# Patient Record
Sex: FEMALE | Race: WHITE | NOT HISPANIC OR LATINO | Employment: FULL TIME
[De-identification: names, ages, dates, MRNs, and addresses within clinical notes are randomized per-mention and may not be internally consistent; named-entity substitution may affect disease eponyms.]

---

## 2017-06-13 ENCOUNTER — TELEPHONE (OUTPATIENT)
Dept: PEDIATRICS | Age: 27
End: 2017-06-13

## 2017-06-13 NOTE — TELEPHONE ENCOUNTER
I received a request to schedule Day in the Genetics Clinic. 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 I left a message for Day to figure out what specifically she is looking for out of the appointment. We may schedule her in [facility] clinic or with one of our genetic counselors. I provided my direct phone number for a return call to schedule.

## 2017-07-12 ENCOUNTER — TRANSFERRED RECORDS (OUTPATIENT)
Dept: HEALTH INFORMATION MANAGEMENT | Facility: CLINIC | Age: 27
End: 2017-07-12

## 2017-10-20 ENCOUNTER — TELEPHONE (OUTPATIENT)
Dept: MATERNAL FETAL MEDICINE | Facility: CLINIC | Age: 27
End: 2017-10-20

## 2017-10-20 NOTE — TELEPHONE ENCOUNTER
Writer called Day and left message to call back the [facility] line at [phone] regarding who is following her for her Loeys-Dara Syndrome as we will need those records and an MIREILLE. Karli Petersen RN

## 2017-10-26 DIAGNOSIS — Q87.89 LOEYS-DIETZ SYNDROME: Primary | ICD-10-CM

## 2017-11-20 ENCOUNTER — PRE VISIT (OUTPATIENT)
Dept: MATERNAL FETAL MEDICINE | Facility: CLINIC | Age: 27
End: 2017-11-20

## 2017-11-21 ENCOUNTER — OFFICE VISIT (OUTPATIENT)
Dept: MATERNAL FETAL MEDICINE | Facility: CLINIC | Age: 27
End: 2017-11-21
Attending: OBSTETRICS & GYNECOLOGY
Payer: COMMERCIAL

## 2017-11-21 DIAGNOSIS — Q87.89 LOEYS-DIETZ SYNDROME: ICD-10-CM

## 2017-11-21 DIAGNOSIS — Z31.69 ENCOUNTER FOR PRECONCEPTION CONSULTATION: Primary | ICD-10-CM

## 2017-11-21 PROCEDURE — 96040 ZZH GENETIC COUNSELING, EACH 30 MINUTES: CPT | Mod: ZF | Performed by: GENETIC COUNSELOR, MS

## 2017-11-21 NOTE — MR AVS SNAPSHOT
"              After Visit Summary   2017    Day Merlos    MRN: [id number]           Patient Information     Date Of Birth          1990        Visit Information        Provider Department      2017 12:45 PM Mally Garcia [facility]        Today's Diagnoses     Loeys-Dara syndrome           Follow-ups after your visit        Who to contact     If you have questions or need follow up information about today's clinic visit or your schedule please contact [facility] directly at [phone].  Normal or non-critical lab and imaging results will be communicated to you by Girltankhart, letter or phone within 4 business days after the clinic has received the results. If you do not hear from us within 7 days, please contact the clinic through PaeDaet or phone. If you have a critical or abnormal lab result, we will notify you by phone as soon as possible.  Submit refill requests through Tidemark or call your pharmacy and they will forward the refill request to us. Please allow 3 business days for your refill to be completed.          Additional Information About Your Visit        MyChart Information     Tidemark lets you send messages to your doctor, view your test results, renew your prescriptions, schedule appointments and more. To sign up, go to [email or web address] . Click on \"Log in\" on the left side of the screen, which will take you to the Welcome page. Then click on \"Sign up Now\" on the right side of the page.     You will be asked to enter the access code listed below, as well as some personal information. Please follow the directions to create your username and password.     Your access code is: [email or web address]  Expires: 2018  5:03 PM     Your access code will  in 90 days. If you need help or a new code, please call your [place] clinic or [phone].        Care EveryWhere ID     This is your Care EveryWhere ID. " This could be used by other organizations to access your [place] medical records  [id number]         Blood Pressure from Last 3 Encounters:   02/21/12 110/70    Weight from Last 3 Encounters:   02/21/12 61.3 kg (135 lb 2.3 oz)              We Performed the Following     [facility]        Primary Care Provider Office Phone # Fax #    Jennifer DEVANTE Peraza [phone]       [facility] [address] [address]        Equal Access to Services     [facility] : Hadii aad ku hadasho Soomaali, waaxda luqadaha, qaybta kaalmada adeegyada, waxay idiin hayaan adeeg kharaimelad nguyen . So [facility] [phone].    ATENCIÓN: Si habla español, tiene a bland disposición servicios gratuitos de asistencia lingüística. [facility] [phone].    We comply with applicable federal civil rights laws and [place] laws. We do not discriminate on the basis of race, color, national origin, age, disability, sex, sexual orientation, or gender identity.            Thank you!     Thank you for choosing [facility]  for your care. Our goal is always to provide you with excellent care. Hearing back from our patients is one way we can continue to improve our services. Please take a few minutes to complete the written survey that you may receive in the mail after your visit with us. Thank you!             Your Updated Medication List - Protect others around you: Learn how to safely use, store and throw away your medicines at www.disposemymeds.org.          This list is accurate as of: 11/21/17  5:03 PM.  Always use your most recent med list.                   Brand Name Dispense Instructions for use Diagnosis    AMBIEN PO      Take 5 mg by mouth At Bedtime        APRI PO      Take  by mouth daily.    Familial aortic aneurysm, Thin skin, Joint hyperextensibility of multiple sites       CATAFLAM 50 MG Tabs   Generic drug:  Diclofenac Potassium      Take  by mouth as  needed.    Familial aortic aneurysm, Thin skin, Joint hyperextensibility of multiple sites       DAILY MULTIVITAMIN PO      Take  by mouth.    Familial aortic aneurysm, Thin skin, Joint hyperextensibility of multiple sites       FLEXERIL 10 MG tablet   Generic drug:  cyclobenzaprine      Take 10 mg by mouth as needed.    Familial aortic aneurysm, Thin skin, Joint hyperextensibility of multiple sites       GABAPENTIN PO      Take 200 mg by mouth daily.    Familial aortic aneurysm, Thin skin, Joint hyperextensibility of multiple sites       metoprolol 25 MG 24 hr tablet    TOPROL-XL     Take 25 mg by mouth daily.    Familial aortic aneurysm, Thin skin, Joint hyperextensibility of multiple sites       midodrine 5 MG tablet    PROAMATINE     Take 5 mg by mouth 3 times daily.    Familial aortic aneurysm, Thin skin, Joint hyperextensibility of multiple sites       omeprazole 20 MG tablet      Take 20 mg by mouth daily.    Familial aortic aneurysm, Thin skin, Joint hyperextensibility of multiple sites       PROZAC PO      Take 20 mg by mouth daily

## 2017-11-21 NOTE — PROGRESS NOTES
"[facility]  Genetic Counseling Consult    Patient:  Day Merlos YOB: 1990   Date of Service:  11/21/17      Day \"Saundra\" Gaurang was seen, along with her  Bigg, at the [facility] for preconception genetic and maternal fetal medicine consultations for the indication of her personal history of Loeys Dara syndrome.       Impression/Plan:   Saundra has a complex medical history.  She and Bigg shared today that they have always been told that Saundra should not become pregnant and they would like to become more informed about what the actual risks would be to Saundra both during and following a pregnancy.    The purpose of our consultation today was to obtain family history and to review that if Saundra should have biological children, they would each have a 50% chance of inheriting the TGFB2 mutation that has been identified in her family.  This genetic change causes the connective tissue disorder Loeys-Dara syndrome, which is a condition that is characterized by enlargement of the aorta, aortic dissection, other arterial aneurysms/dissections, skeletal problems, contractures, degenerative disc disease, and other features of connective tissue disorders including abnormal wound healing, easy bruising, and hyper flexibility.  Symptoms of Loeys-Dara can vary in severity and presence between family members.  Prenatal complications can also include incompetent cervix, PPROM, and fetal birth defects such as cleft palate and club foot.  There is also significant maternal risk factors (see MFM consult).    IVF with PGD is an option as the familial mutation is known.    Saundra and Bigg shared that they have assumed that pregnancy is contraindicated for Saundra, and are uncertain if they would desire for her to achieve a pregnancy.  They would like to start with MFM consult today to gather more information and will continue to consider if it " is something they desire.        Pregnancy History:     /Parity:        Family History:   A three-generation pedigree was obtained, and is scanned under the  Media  tab.   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  Her symptoms include hyperextensibility, thin skin with increased vascularity, scoliosis, and lumbar hemivertebrae.  She has many affected family members including her brother, mom, 3 maternal aunts, and 2 maternal uncles, and numerous cousins.  There is also a history of aneurysms and mitral valve prolapse for many of her family members.  These are features of the family's underlying connective tissue disorder.  Risk to Saundra's children is 50%.      Otherwise, the reported family history is negative for multiple miscarriages, stillbirths, birth defects, mental retardation, known genetic conditions, and consanguinity.          It was a pleasure to be involved with Day s care. Face-to-face time of the meeting was 30 minutes.      Mally Garcia, [facility]  Certified Genetic Counselor  Pager: [phone]

## 2017-11-21 NOTE — PROGRESS NOTES
Maternal Fetal Medicine Consult      Referring Physician: Dr. Darby Marrero  Reason for Referral: Preconception counseling- Loeys-Dara syndrome      Dear Dr. Marrero,     Thank you for your request for maternal fetal medicine consultation on your patient, Day Merlos.  She is here today with her significant other, Bigg.      HPI: Day Merlos is a 27 year old  with a medical history of Loeys-Dara (LD) syndrome, POTS syndrome, migraines, spinal stenosis, scoliosis, and depression/anxiety who presents for preconception counseling. Her LD syndrome manifests as join laxity and injury, bruising, scoliosis, and slow healing. She has lumbar spinal stenosis and underwent lumbar fusion in . She reports a prolonged hospital stay due to slow healing following this procedure. She also has a history of bilateral inguinal hernia repair as a young child. She denies a personal history of aneurysm, arrhythmias, excessive bleeding, or joint dislocation. She denies a history of easy bleeding after her lumbar fusion or following wisdom teeth removal. She reports her cardiologist has instructed her to avoid activities that would increase her blood pressure including prolonged vomiting, cough, and strenuous exercise. She has had multiple MRA of her head/neck and multiple echos in the past which have shown no evidence of aneurysm or dissection. She reports this summer she obtained an MRA head/neck, CT chest/abdomen/pelvis, and an echo. Again these studies revealed no evidence of aneurysm or dissection, however her CT scan revealed an incidental liver lesion. She is getting a follow-up CT scan to assess the liver lesion later this year.    She was diagnosed with LD syndrome 4 years ago when her and her mother underwent whole exome sequencing. 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 Sequencing was performed at the [facility], however I do not have access to the  record at this time. Leona from the genetic counseling center will call lab to get a copy of the report scanned into her chart. See genetic counseling consultation for further details. She has an extensive maternal family history of LD syndrome. Her 3 maternal aunts have required mitral valve replacement for mitral valve regurgitation and aortic valve replacements. Her mother had mitral valve regurgitation and underwent mitral valve replacement. One of her aunts had an aortic aneurysm and was recommended to undergo repair, however she passed away from an unrelated condition prior to repair. Her mother was recommended to undergo aortic valve replacement however she passed away related to malfunction of the prosthetic mitral valve. She thinks one maternal grandparent had LD syndrome and the other had Marfan syndrome, and she thinks that is why her maternal aunts have had severe complications of connective tissue disease.     She also reports a diagnosis of POTS syndrome at 17 years old. She was started on metoprolol and midodrine for that diagnosis and reports good control with this regimen.        History:  ObHx:     GynHx:   Menses: IUD in place, amenorrhea   Uterine procedures: none    PMHx:   Past Medical History:   Diagnosis Date     Anxiety      Depression      Loeys-Dara syndrome      Migraine      POTS (postural orthostatic tachycardia syndrome)      Scoliosis     s/p lumbar spine fusion      SurgHx:   Past Surgical History:   Procedure Laterality Date     C LUMBAR SPINE FUSION,ANTER APPRCH       HERNIORRHAPHY INGUINAL CHILD BILATERAL Bilateral      Medications:   Prozac- anxiety/depression  Flexeril PRN- back pain  Metoprolol 25mg XL- POTS syndrome  Midodrine- POTS syndrome  Diclofenac- migraines   Gabapentin 300mg daily- migraines   Ranitidine- GERD    Allergies:   Allergies   Allergen Reactions     Amoxicillin Hives     Cephalexin Hcl Hives     Penicillins Hives     Adhesive Tape Rash     Compazine  [Prochlorperazine] Anxiety and Other (See Comments)     akathisia     Soc:   Never smoker  Alcohol 1-2 drinks per month  No drug use  Works as an RN on Labor and Delivery in [place]    FamHx:   Please see genetic counselor note for full family medical history.   Maternal LD syndrome: Mother, three maternal aunts, Brother, maternal grandparents.     Lab/Imaging    PEDIATRIC ECHOCARDIOGRAM- 2/21/2012  [facility]  Echocardiogram Lab   Phone [phone]                         Fax [phone]  Age:  [birth year]               Wt:   61.2 kg         Ht:  178 cm        BSA:  1.77 m2          BP:                               Xcelera                                                            Technician:  mrINDICATION:  Familial aortic aneurysm  A cardiac ultrasound study based on an exam which included:   M-Mode                       2-D                        Doppler                             Color FlowCONCLUSION:  Family history of familial aortic aneurysm.  Normal intracardiac anatomy. Good LV/RV function. Aortic root measurements at the sinus of Valsalva, sinotubular ridge and ascending aorta are within normal limits. Z-score values are enclosed.     M-Mode Report          Left Atrium  37   mm          Aortic Root  29  mm               LV end Diastolic  43   mm          LV end Systolic   29  mm                     Shortening Fraction     32%          Intravent Septum  8  mm          LV Post Wall  8  mm                 1. No ECG is recorded.  2. Normal left atrial dimension.  3. Normal left ventricular dimension and contractility.     2-Dimensional Report  Recordings are performed from parasternal, apical, subcostal and suprasternal notch windows. Anatomic relationships are normal. Aortic, mitral, pulmonary and tricuspid valve motions are normal. The systemic venous return was demonstrated and is normal. The atrial septum appears intact. The coronary sinus is normal. Left atrial size is  normal. Right atrial size is normal. Left and right ventricular size and contractility are normal. The ventricular septum appears intact. There is no evidence of pericardial effusion. The pulmonary artery bifurcation and aortic arch appear normal. The sinus of Valsalva was 30 mm, z-score value is +.6 normal value. Sinotubular ridge is 22 mm, z-score value -.5 which is normal. Ascending aorta is 26 mm, normal z-score is at the 83rd percentile.       Not Evaluated  The aortic arch situs and coronary arteries were not evaluated.     Doppler Report  Color flow and spectral Doppler are utilized. Trivial tricuspid insufficiency, incomplete velocity signal. No mitral insufficiency. Tricuspid inflow is 0.8 m/sec. Normal flows are recorded in the right ventricular outflow tract, main pulmonary artery (1 m/sec), left ventricular outflow tract and ascending aorta (1 m/sec). At least two pulmonary veins return to the left atrium. Arch is unobstructed. abdominal aortic Doppler is normal. Descending aortic peak velocity is 1.1 m/sec.  There is no evidence of a left-to-right shunt at atrial, ventricular or great artery levels.    Luciano Bronw MD-Pager [phone]  Carli Mcgee MD-Pager [phone]  Juan Quintana MD-Pager [phone] or [phone]  Jason Lozano MD-Pager [phone]  Rony Ceja MD-Pager [phone]  Ena Perales MD-Pager [phone]  Joe Shane MD-Pager # [phone]  Tiffanie King MD - Pager  [phone]       Neck MRA without and with contrast  1/26/2016     History: Cerebral aneurysm, nonruptured.      Comparison: none      Technique: 2D time-of-flight and contrast-enhanced 3D time-of-flight images of the major cervical vessels were obtained without intravenous contrast. Images were reviewed on the 3-dimensional workstation. Limited non contrast 2DTOF images were obtained of the mid-cervical region. Following intravenous gadolinium-based contrast administration, a contrast  enhanced MRA of the neck/cervical vessels was performed. Three-dimensional reconstructions of the neck and head MRA were created, which were reviewed by the radiologist.     Contrast: 7.5 ml Gadavist injected     Findings:  Neck MRA demonstrates patent major cervical arteries. Origin of the bilateral common carotid, internal carotid, external carotid arteries are patent. No evidence for stenosis or dissection in bilateral vertebral arteries. No evidence of aneurysm in the major cervical arteries. Antegrade flow in the major cervical vasculature.     Impression:  Normal neck MRA.         I have personally reviewed the examination and initial interpretation and I agree with the findings.     LARS GARCIA MD      ______________________________________________________________    MRA ANGIOGRAM HEAD W/O CONTRAST 1/26/2016 12:29 PM     History: Cerebral aneurysm, nonruptured. History of enlarged aortic  root and a repaired mitral valve. Migraines.     Comparison:  None available.     Technique:   MRI: Sagittal T1-weighted, axial T2-weighted with fat saturation, turboFLAIR and diffusion-weighted with ADC map and coronal T1-weighted and T2-weighted images of the brain were obtained without intravenous contrast.       MRA Head:  Using a 3D time-of-flight image acquisition technique, MRA of the major arteries at the base of the brain was obtained without intravenous contrast. Three-dimensional reconstructions of the head MRA were created, which were reviewed by the radiologist.     Findings:   Head MRA demonstrates patent major intracranial arteries, without evidence of aneurysm. There is 2.2 mm infundibulum in the origin of the left posterior communicating artery. The anterior communicating artery appears patent. The posterior communicating artery is patent bilaterally.          Impression:   2.2 mm infundibulum in the left PComm origin. Otherwise no evidence of intracranial aneurysm is identified.         I have  personally reviewed the examination and initial interpretation and I agree with the findings.     LARS GARCIA MD       MRI BRAIN AND MRA HEAD WITHOUT CONTRAST- 2/28/2011    CLINICAL HISTORY: Headache.    MRI BRAIN WITHOUT CONTRAST    FINDINGS: The ventricles and sulci are within normal limits for age. There is no midline shift or mass effect. The basal cisterns are patent. There is no white matter disease. There are no foci of restricted diffusion to suggest acute ischemia. The paranasal sinuses and mastoid air cells are clear. The globes and orbits are unremarkable.    IMPRESSION: Unremarkable MRI of the brain.    MRA OF THE HEAD WITHOUT CONTRAST    FINDINGS: The upper vertebral arteries are codominant and widely patent. The basilar arteries are normal in caliber and patent. The posterior cerebral arteries are unremarkable. There appear to be patent bilateral PCOM arteries, though small on the right.     The distal internal carotid arteries, middle cerebral arteries, anterior cerebral arteries and region of the ACOM are essentially unremarkable apart from diminished caliber of the left ICA at the supraclinoid portion which is probably artifactual. There is no aneurysm. There is no high-grade stenosis or occlusion. There is no vascular malformation.    IMPRESSION: Essentially unremarkable MRA of the head. No aneurysm.       Impression:  We discussed with Ms. Solorzano that limited data is available to guide management of Loeys-Dara (LD) in pregnancy. However, it behaves similar to Salinas-Danlos syndrome Type IV and is treated in a similar manner during pregnancy. LD syndrome inheritance form is autosomal dominant however it can also arise de angie. Loeys-Dara syndrome can be associated with craniofacial characteristics (ex.Type 1 Loeys-Dara syndrome is associated with hypertelorism, craniosynostosis, cleft palate), scoliosis, easy bruising, widespread arterial tortuosity, and aortic aneurysms.  Aortic  aneurysms in LD syndrome have a higher risk of dissection or rupture, and this can occur at a smaller size than the general population. We recommend close follow up with cardiology during pregnancy- we would refer to Dr. Hannah Quintana for preconception consultation as well. We also recommend that she speak with a vascular surgeon (we recommended Dr. Misha Garcia) and with hematology due to risk of co-existing bleeding diatheses in patients with connective tissue disorders. We also recommend management or co-management with MFM during pregnancy. We discussed the following maternal, fetal, delivery, and post-partum considerations for patients with LD syndrome:     Maternal considerations: Hemodynamic and physiologic changes in pregnancy and the post-partum period increase the risk of aortic aneurysm and dissection. Most information regarding maternal outcomes in patients with LD syndrome are based on case studies. Women with LD syndrome have an increased risk of aortic dissection in pregnancy, however an accurate assessment of risk of dissection compared to the baseline risk is difficult to determine. Aortic dissection can occur at any time in pregnancy but most commonly in the third trimester or post-partum and can have high morbidity and mortality. Therefore we recommend an echo prior to pregnancy (patient reports she had an echo in 2017 which was normal) as well as a comprehensive angiogram imaging of the brain, thorax, abdomen, and pelvis (patient reports she had an MRA head/neck and CT chest/abdomen/pelvis in 2017 which were normal). She signed a release of information for [facility] today. We recommend continuing a beta-blocker in pregnancy. We discussed the associated fetal growth restriction with beta blockers however the benefits of beta blocker in this patient outweigh that risk. Labetalol is the most studied beta blocker in pregnancy and has a good safety profile, however continuing metoprolol is reasonable.  We do not recommend atenolol in pregnancy. Blood pressure goal should be <130/80 during pregnancy. We recommend repeat echo in the 1st trimester and 3rd trimester. We do not recommend angiography in pregnancy unless she develops signs or symptoms concerning for aortic aneurysm or dissection. In that circumstance we recommend MRA, however CT angiography should be used in the urgent setting. We also discussed that pregnancy can lead to worsening joint function and pain. Finally, we discussed the option of adoption or gestational carrier to avoid the maternal risks associated with LD syndrome.    Fetal considerations: We discussed the option for pre-conception genetic testing through reproductive infertility specialist to avoid transmission of LD syndrome to the fetus. We also discussed the option of fetal genetic screening with CVS or amniocentesis. Due to the risk of fetal inheritance, we recommend a level 2 ultrasound between 18-20 weeks gestation and a fetal echo between 22-24 weeks gestation. We also recommend growth scans every 4 weeks after the second trimester ultrasound. Due to the increased risk of short cervix in patients with LD syndrome (she reports her mother required cerclage for both pregnancies), we recommend serial cervical length testing starting at 16 weeks gestation.     Delivery considerations: We recommend delivery at a tertiary care center where a NICU and vascular surgeon are available. We recommend  delivery between 34-36 weeks gestation due to reported risk of pelvic vessel rupture, uterine rupture, and aortic dissection during labor. She should receive  betamethasone prior to delivery for fetal lung maturity. We do not recommend a trial of labor. We recommend antepartum consultation with anesthesia regarding epidural vs general anesthesia consideration due to her complex spine history. Blood pressure should be monitored closely with goal to remain <130/80. Medications that  can result in increased blood pressure (ex Methergine in the case of post-partum hemorrhage) should be used with caution.    Post-partum period:  Post-operatively, she is at increased risk of poor wound healing or dehiscence and should have close follow-up. In addition, the risk of aortic dissection associated with pregnancy persists for weeks after delivery and can occur despite satisfactory blood pressure control. She should seek immediate medical attention in the event of new symptoms compatible with aortic dissection.    We reviewed that our MFM group does have experience with recent patients with confirmed vascular (type 4) EDS who had excellent outcomes, but that pregnancy does have to be carefully considered because there are substantial risks associated with it.  The patient's mother had 2 children (vaginally).  The patient's 1 aunt had a number of children and the other affected aunt did not have any children.     We did address POTS briefly and would continue to co-manage this condition with cardiology.       Recommendations:   1. Close follow up with Cardiology in pregnancy.   2. Recommend consultation with vascular surgery and hematology and cardiology at the [facility].  3. Co-management of pregnancy with MFM  4. Maternal considerations   1. Baseline Echo prior to pregnancy, repeat echo in the 1st trimester and 3rd trimester   2. Baseline comprehensive angiography of the brain, thorax, abdomen, and pelvis prior to pregnancy. Repeat angiography as needed during pregnancy if concerns of aortic aneurysm  3. Continue beta blocker in pregnancy  4. Goal blood pressure in pregnancy <130/80  5. Fetal considerations   1. Pregestational genetic testing and/or gestational genetic testing per patient preference  2. Cervical length monitoring every two weeks between 16-23 weeks gestation   3. Level 2 ultrasound at 18-20 weeks gestation and monthly growth scans thereafter   4. Fetal echo at 22-24 weeks  gestation   6. Delivery considerations  1. Anesthesia consult antepartum  2.  delivery between 34-36 weeks gestation following  betamethasone course for fetal lung maturity  3. Delivery at a tertiary care center with NICU and vascular surgeon available (alerted the patient that delivery in [place] is probably unlikely).  4. Caution with medications that may increase blood pressure  7. Post-partum  1. Close monitoring of wound healing  2. Close monitoring for signs or symptoms of aortic aneurysm or dissection    Follow up with MFM when pregnancy is confirmed.     I served as scribe for Dr. Solorzano for this consultation.     Francoise Yu MD   Resident Physician, PGY2  Obstetrics, Gynecology, and Women's Health     A copy of this consultation will be sent to your office.    Thank you for the opportunity to participate in the care of this patient.  If you have questions regarding today s evaluation or if we can be of further service, please contact the [facility]    Attestation:   The documentation recorded by the scribe accurately reflects the services I personally performed and the decisions made by me.   Alycia Solorzano, DO  Maternal Fetal Medicine Specialist         The patient was seen for an established outpatient visit.  I spent a total of 60 minutes face to face with Day Merlos during today's visit. Over 50% of this time was spent counseling the patient and/or coordinating care regarding preconception counseling for LD syndrome.

## 2017-11-21 NOTE — NURSING NOTE
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 Leona Garcia met with family. Dr Yu and Dr Solorzano met with family to discuss MFM recommendations. [facility] obtain most recent maternal ECHO/CT/MRA this past summer. Day encouraged to make appt with [facility] for f/u. Patient discharged stable and ambulatory. TESSA NOVAK RN

## 2017-11-21 NOTE — MR AVS SNAPSHOT
"              After Visit Summary   2017    Day Merlos    MRN: [id number]           Patient Information     Date Of Birth          1990        Visit Information        Provider Department      2017 1:30 PM Alycia Solorzano, DO [facility]        Today's Diagnoses     Encounter for preconception consultation    -  1    Loeys-Dara syndrome           Follow-ups after your visit        Who to contact     If you have questions or need follow up information about today's clinic visit or your schedule please contact [facility] directly at [phone].  Normal or non-critical lab and imaging results will be communicated to you by Chope Grouphart, letter or phone within 4 business days after the clinic has received the results. If you do not hear from us within 7 days, please contact the clinic through [email or web address] or phone. If you have a critical or abnormal lab result, we will notify you by phone as soon as possible.  Submit refill requests through Loudie or call your pharmacy and they will forward the refill request to us. Please allow 3 business days for your refill to be completed.          Additional Information About Your Visit        MyChart Information     Loudie lets you send messages to your doctor, view your test results, renew your prescriptions, schedule appointments and more. To sign up, go to [email or web address] . Click on \"Log in\" on the left side of the screen, which will take you to the Welcome page. Then click on \"Sign up Now\" on the right side of the page.     You will be asked to enter the access code listed below, as well as some personal information. Please follow the directions to create your username and password.     Your access code is: [email or web address]  Expires: 2018  5:03 PM     Your access code will  in 90 days. If you need help or a new code, please call your [place] clinic or [phone].      "   Care EveryWhere ID     This is your Care EveryWhere ID. This could be used by other organizations to access your [place] medical records  [id number]         Blood Pressure from Last 3 Encounters:   02/21/12 110/70    Weight from Last 3 Encounters:   02/21/12 61.3 kg (135 lb 2.3 oz)              We Performed the Following     [facility] Office Visit        Primary Care Provider Office Phone # Fax #    Jennifer DEVANTE Peraza [phone]       [facility] [address] [address]        Equal Access to Services     [facility]: Hadii aad ku hadasho Soomaali, waaxda luqadaha, qaybta kaalmada adeegyada, waxjennifer redd haymanuel nguyen . So [facility] [phone].    ATENCIÓN: Si habla español, tiene a bland disposición servicios gratuitos de asistencia lingüística. [facility] [phone].    We comply with applicable federal civil rights laws and [place] laws. We do not discriminate on the basis of race, color, national origin, age, disability, sex, sexual orientation, or gender identity.            Thank you!     Thank you for choosing [facility]  for your care. Our goal is always to provide you with excellent care. Hearing back from our patients is one way we can continue to improve our services. Please take a few minutes to complete the written survey that you may receive in the mail after your visit with us. Thank you!             Your Updated Medication List - Protect others around you: Learn how to safely use, store and throw away your medicines at www.disposemymeds.org.          This list is accurate as of: 11/21/17 11:59 PM.  Always use your most recent med list.                   Brand Name Dispense Instructions for use Diagnosis    AMBIEN PO      Take 5 mg by mouth At Bedtime        APRI PO      Take  by mouth daily.    Familial aortic aneurysm, Thin skin, Joint hyperextensibility of multiple sites       CATAFLAM 50 MG Tabs   Generic  drug:  Diclofenac Potassium      Take  by mouth as needed.    Familial aortic aneurysm, Thin skin, Joint hyperextensibility of multiple sites       DAILY MULTIVITAMIN PO      Take  by mouth.    Familial aortic aneurysm, Thin skin, Joint hyperextensibility of multiple sites       FLEXERIL 10 MG tablet   Generic drug:  cyclobenzaprine      Take 10 mg by mouth as needed.    Familial aortic aneurysm, Thin skin, Joint hyperextensibility of multiple sites       GABAPENTIN PO      Take 200 mg by mouth daily.    Familial aortic aneurysm, Thin skin, Joint hyperextensibility of multiple sites       metoprolol 25 MG 24 hr tablet    TOPROL-XL     Take 25 mg by mouth daily.    Familial aortic aneurysm, Thin skin, Joint hyperextensibility of multiple sites       midodrine 5 MG tablet    PROAMATINE     Take 5 mg by mouth 3 times daily.    Familial aortic aneurysm, Thin skin, Joint hyperextensibility of multiple sites       omeprazole 20 MG tablet      Take 20 mg by mouth daily.    Familial aortic aneurysm, Thin skin, Joint hyperextensibility of multiple sites       PROZAC PO      Take 20 mg by mouth daily

## 2017-11-25 ENCOUNTER — HEALTH MAINTENANCE LETTER (OUTPATIENT)
Age: 27
End: 2017-11-25

## 2018-01-29 ENCOUNTER — TRANSFERRED RECORDS (OUTPATIENT)
Dept: HEALTH INFORMATION MANAGEMENT | Facility: CLINIC | Age: 28
End: 2018-01-29

## 2018-02-05 ENCOUNTER — TRANSFERRED RECORDS (OUTPATIENT)
Dept: HEALTH INFORMATION MANAGEMENT | Facility: CLINIC | Age: 28
End: 2018-02-05

## 2018-02-06 ENCOUNTER — TRANSFERRED RECORDS (OUTPATIENT)
Dept: HEALTH INFORMATION MANAGEMENT | Facility: CLINIC | Age: 28
End: 2018-02-06

## 2018-02-19 ENCOUNTER — TRANSFERRED RECORDS (OUTPATIENT)
Dept: HEALTH INFORMATION MANAGEMENT | Facility: CLINIC | Age: 28
End: 2018-02-19

## 2018-02-22 ENCOUNTER — MEDICAL CORRESPONDENCE (OUTPATIENT)
Dept: HEALTH INFORMATION MANAGEMENT | Facility: CLINIC | Age: 28
End: 2018-02-22

## 2018-04-06 PROBLEM — Q87.89: Status: ACTIVE | Noted: 2018-04-06

## 2018-05-08 ENCOUNTER — OFFICE VISIT (OUTPATIENT)
Dept: CARDIOLOGY | Facility: CLINIC | Age: 28
End: 2018-05-08
Attending: INTERNAL MEDICINE
Payer: COMMERCIAL

## 2018-05-08 VITALS
BODY MASS INDEX: 21.2 KG/M2 | SYSTOLIC BLOOD PRESSURE: 110 MMHG | DIASTOLIC BLOOD PRESSURE: 72 MMHG | OXYGEN SATURATION: 98 % | WEIGHT: 148.1 LBS | HEIGHT: 70 IN | HEART RATE: 54 BPM

## 2018-05-08 DIAGNOSIS — Q87.89 LOEYS-DIETZ SYNDROME TYPE 4: Primary | ICD-10-CM

## 2018-05-08 DIAGNOSIS — G90.A POTS (POSTURAL ORTHOSTATIC TACHYCARDIA SYNDROME): ICD-10-CM

## 2018-05-08 PROCEDURE — 93010 ELECTROCARDIOGRAM REPORT: CPT | Mod: ZP | Performed by: INTERNAL MEDICINE

## 2018-05-08 PROCEDURE — 99205 OFFICE O/P NEW HI 60 MIN: CPT | Mod: ZP | Performed by: INTERNAL MEDICINE

## 2018-05-08 PROCEDURE — G0463 HOSPITAL OUTPT CLINIC VISIT: HCPCS | Mod: 25,ZF

## 2018-05-08 PROCEDURE — 99354 ZZC PROLONGED SERV,OFFICE,1ST HR: CPT | Performed by: INTERNAL MEDICINE

## 2018-05-08 PROCEDURE — 93005 ELECTROCARDIOGRAM TRACING: CPT

## 2018-05-08 ASSESSMENT — PAIN SCALES - GENERAL: PAINLEVEL: NO PAIN (0)

## 2018-05-08 NOTE — NURSING NOTE
Chief Complaint   Patient presents with     New Patient     27 yr old female with PMH significant for Loey's-Dara with bradycardia presenting for evaluation     Vitals were taken and medications were reconciled. EKG was performed    Ruby HERNANDEZ  12:30 PM

## 2018-05-08 NOTE — LETTER
2018      RE: Day Merlos  [address]  [place] [address]       Dear Colleague,    Thank you for the opportunity to participate in the care of your patient, Day Merlos, at the [facility] HEART [facility]. Please see a copy of my visit note below.             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    Family Genetics History:   Mother: h/o mitral and aortic valve replacement at early age (diagnosed with valve disease in high school).  at 54 yrs old from ?thrombosed mitral valve replacement and subsequent congestive heart failure. Father is healthy and alive. Maternal aunt passed at 62 years old unknown etiology. Maternal uncle passed at 59 of unknown etiology. Other aunt passed at 63 due to fall and brain bleed. Brother has LDS. Patient has no children but plans on getting pregnant in some time.     Discussed history of patient herself. She is from [facility] and has most of her care up there, primarily pediatric neurology and family practice. She has been carrying longstanding history of POTS as well as migraines for which she is followed closely with neurology in [place]. She has been getting intermittent brain/neck MRA for known cerebral tortuosity (details unclear - available imaging through care everywhere reveals only normal CT neck angiogram in  and MRA without contrast? In ). Imaging she's had of her chest demonstrated normal caliber aortic root and ascending aorta. Echocardiogram has report of normal aortic valve and mitral valve from .    For POTS, she has been on Toprol 25 XL daily. She did try 12.5 mg bid short acting metoprolol but had severe symptoms in between doses. She feels close to passing out as soon as HR goes high and she  gets extremely winded from tachycardia. HR goes up to 140s-180s at that time. She was told in the past she may need pacemaker. She has never taken midodrine. Last tilt table test was in 2008 that showed HR in the 120s at 70 degree tilt within 5 minutes and subsequent drop in blood pressure.     Patient is an athlete currently - she plays competitive volleyball and also walks, uses eliptical machine, and does light weight lifting. She has no h/o pregnancy but wishes to have children in the future.    On ROS she describes the following: high arch palate (no bifid uvula or cleft palate), easy bruising, arthritis, scoiliosis, hernia repair at 2, joint laxity, heavy periods, migraines and neck pain. No lens dislocation, uvula deformity, chest wall deformity. She had scoliosis surgery when she was younger with no complications per her recollection. Also reports poor circulation of her feet and has been noted to have absent pulses. Also describes upper extremity poor circulation and reynauds. Also has varicose veins.    PAST MEDICAL HISTORY  Past Medical History:   Diagnosis Date     Anxiety      Depression      Loeys-Dara syndrome      Migraine      POTS (postural orthostatic tachycardia syndrome)      Scoliosis     s/p lumbar spine fusion        CURRENT MEDICATIONS  Current Outpatient Prescriptions   Medication Sig Dispense Refill     cyclobenzaprine (FLEXERIL) 10 MG tablet Take 10 mg by mouth as needed.       Diclofenac Potassium (CATAFLAM) 50 MG TABS Take  by mouth as needed.       FLUoxetine HCl (PROZAC PO) Take 20 mg by mouth daily       GABAPENTIN PO Take 300 mg by mouth daily        metoprolol (TOPROL-XL) 25 MG 24 hr tablet Take 25 mg by mouth daily.       midodrine (PROAMATINE) 5 MG tablet Take 5 mg by mouth 3 times daily.       Multiple Vitamin (DAILY MULTIVITAMIN PO) Take  by mouth.       Zolpidem Tartrate (AMBIEN PO) Take 1.75 mg by mouth At Bedtime        Desogestrel-Ethinyl Estradiol (APRI PO) Take  by mouth  "daily.       Omeprazole 20 MG tablet Take 20 mg by mouth daily.         PAST SURGICAL HISTORY:  Past Surgical History:   Procedure Laterality Date     C LUMBAR SPINE FUSION,ANTER APPRCH  2000     HERNIORRHAPHY INGUINAL CHILD BILATERAL Bilateral        ALLERGIES     Allergies   Allergen Reactions     Amoxicillin Hives     Cephalexin Hcl Hives     Penicillins Hives     Adhesive Tape Rash     Compazine [Prochlorperazine] Anxiety and Other (See Comments)     akathisia       FAMILY HISTORY  No family history on file.    VASCULAR FAMILY HISTORY  1st order relative with atherosclerotic PAD: unknown  1st order relative with AAA: unknown    SOCIAL HISTORY  Social History     Social History     Marital status:      Spouse name: N/A     Number of children: N/A     Years of education: N/A     Occupational History     Not on file.     Social History Main Topics     Smoking status: Never Smoker     Smokeless tobacco: Never Used     Alcohol use Yes     Drug use: No     Sexual activity: Yes     Birth control/ protection: IUD     Other Topics Concern     Not on file     Social History Narrative       ROS:   Constitutional: No fever, chills, or sweats. No weight gain/loss   ENT: No visual disturbance, ear ache, epistaxis, sore throat  Allergies/Immunologic: Negative  Respiratory: No cough, hemoptysia  Cardiovascular: As per HPI  GI: No nausea, vomiting, hematemesis, melena, or hematochezia  : No urinary frequency, dysuria, or hematuria  Integument: Negative  Psychiatric: Negative  Neuro: Negative  Endocrinology: Negative   Musculoskeletal: Negative  Vascular: No walking impairment, claudication, ischemic rest pain or nonhealing wounds    EXAM:  /72 (BP Location: Left arm, Patient Position: Chair, Cuff Size: Adult Regular)  Pulse 54  Ht 1.765 m (5' 9.5\")  Wt 67.2 kg (148 lb 1.6 oz)  SpO2 98%  BMI 21.56 kg/m2  In general, the patient is a pleasant female in no apparent distress.    HEENT: NC/AT.  LAUREN.  FELICIANOMI.  " Sclerae white, not injected.  Nares clear.  Pharynx without erythema or exudate.  Dentition intact.   Neck: No adenopathy.  No thyromegaly. Carotids +2/2 bilaterally without bruits.  No jugular venous distension.   Heart: RRR. Normal S1, S2 splits physiologically. No murmur, rub, click, or gallop. The PMI is in the 5th ICS in the midclavicular line. There is no heave.    Lungs: CTA.  No ronchi, wheezes, rales.  No dullness to percussion.   Abdomen: Soft, nontender, nondistended. No organomegaly. No AAA.  No bruits.   Extremities: No clubbing, cyanosis. No wounds. Trace edema BL with +varicosities.    Vascular: No bruits are noted. Absent DP pulses bilaterally with 2+ PT, 2+ radial, 2+ carotid, 2+ femoral.     Labs:  LIPID RESULTS:  No results found for: CHOL, HDL, LDL, TRIG, CHOLHDLRATIO, NHDL    LIVER ENZYME RESULTS:  No results found for: AST, ALT    CBC RESULTS:  Lab Results   Component Value Date    HGB 12.3 04/01/2014       BMP RESULTS:  No results found for: NA, POTASSIUM, CHLORIDE, CO2, ANIONGAP, GLC, BUN, CR, GFRESTIMATED, GFRESTBLACK, EILEEN     A1C RESULTS:  No results found for: A1C    Procedures:      CT ANGIO CAROTID    CLINICAL: Vertebral artery dissection.    TECHNICAL: Omnipaque-300 80 mL IV.    FINDINGS: Degraded examination secondary to motion. Both vertebral  arteries are patent. They are visualized to the level the basilar  artery and intracranial circulation. Both common carotid arteries are  patent. ECAs and ICAs are patent bilaterally. Left subclavian artery  and right brachiocephalic and subclavian arteries appear patent.  Patent anterior and posterior circulation within the brain to the  level of the [place].    IMPRESSION: Negative examination.    Tilt Table (2009)   Blood pressure at rest was 109/74, heart rate 74 beats a minute. After tilting    up the table to 70 degrees, blood pressure first went up to 102/74 and heart    rate 107 beats a minute and then later the heart rate was  91/71 and pulse rate    was 126 beats a minute at 5 minutes.      The test was terminated then. The patient had no shortness of breath but had    chest pain. After bringing the table back to 0 degrees the heart rate    continues to be in the 50s. Blood pressure did not change.     Chest CTPE 2009    Signs and Symptoms for Radiological Exam from IDX:       * CHEST PAIN/SOB    CT CHEST FOR PULMONARY EMBOLI    TECHNICAL DATA: Omnipaque 300, 82 mL IV.    FINDINGS: Normal thoracic aorta. The pulmonary arterial tree is  normally opacified. No adenopathy. The lung fields are clear.    IMPRESSION: Normal evaluation.    Echocardiogram 2009  INTERPRETATION: An M-mode, 2-D, and Doppler echocardiogram was performed.      Left ventricular cavity size is normal. Wall thickness is normal. Regional and    global systolic function are normal. Left ventricular ejection fraction is    visually estimated to be 55%. Left atrium, right atrium, right ventricular    chambers are normal in size. The aortic root diameter is normal in size. The    aortic valve leaflets are normal in thickness and mobility with trace aortic    insufficiency. Mitral valve leaflets are normal in thickness and mobility with    trace mitral regurgitation. Tricuspid valve leaflets are normal in thickness    and mobility. There is no pericardial effusion. Distal aortic arch and    proximal descending thoracic aorta are normal on 2-D and Doppler    echocardiography.     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 She is here for referral to vascular cardiac genetics clinic. MSK involvement includes scoliosis, skin involvement +wound healing abnormalities, with no lens dislocation, or joint laxity. Cranio features include wideset eyes but no bifid uvula or cleft palate, although she has high arch  palate. She reports abnormal tortuosity of intracranial vessels although unable to obtain documentation as carotid CTA and MR without contrast reported normal vessels. Echocardiogram demonstrates no significant valve disease although this was back in 2009.     She has ongoing issues including the following:   -venous insufficiency  -poor UE and LE circulation  -symptomatic POTS but on stable regimen of toprol XL 25    Given the unpredictable nature of vasculature in LDS (especially with associated craniofacial abnormalities), current AHA/ACC guidelines suggest yearly surveillance from intracranial vessels to pelvis. If she is to develop even borderline aortic dilation (ideal to report aortic diameter in Z score and index given her young age, tall height and need for following), early surgical intervention is warranted in these patients compared to other vascular syndromes.     She has not yet had abdomen/pelvis vasculature defined, therefore will obtain MRA/MRV abdomen/pelvis and capture LE vessels given her lack of LASHON pulses and poor circulation and also varicose veins and edema. If this is normal, will obtain routine MRA C/A/P without venogram yearly. In 6 months at same time as visit with me, will obtain the MRA chest and cardiac MRI to evaluate for valvular and aortic abnormalities.     UE duplex can be uptained with distal PPGs to evaluate her raynaud's phenomenon. Will prescribe her GCS 20-30 mmHg for mild venous insufficiency as she is on her feet often and if plans to get pregnant, this will be made worse. CVI is often seen in POTS as well as LDS.    For POTS, there is reported association with connective tissue disease, however with LDS this seems rare (more often EDS type III - hypermobile type and not vascular type IV). She seems to be most stable on toprol XL 25 daily. Offered to switch to short acting for additional titration however patient reports she has gone through multiple regimens and this seems  to work best. At this time she has no syncope, no significant bradycardia therefore I do not see indication for pacemaker unless increasing need for higher dose beta blocker was necessary with subsequent drop in HR and BP. But as she describes, she is OK on current regimen. Again discussed lifestyle modifications. Would avoid any agents that increase BP at this time given her vasculopathy therefore hesitant with mineralocorticoids but could consider if she gets persistently hypotensive. It may be worth in future to repeat her TILT on ideal beta blockade regimen.    Lastly, will need very careful monitoring if she plans to get pregnant and a co-management strategy with high risk MFM. Happy to continue to see patient around that time.    Will see patient back in 6 months. It was a pleasure taking part in the care of this patient.     Total time spent 90 minutes, of which >50% was spent in face-to-face patient evaluation, reviewing data with patient, and coordination of care.       Stephany Noble MD MSc  , Aortopathy Clinic  Division of Vascular Medicine and Congenital Heart Disease  [facility]

## 2018-05-08 NOTE — PROGRESS NOTES
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    Family Genetics History:   Mother: h/o mitral and aortic valve replacement at early age (diagnosed with valve disease in high school).  at 54 yrs old from ?thrombosed mitral valve replacement and subsequent congestive heart failure. Father is healthy and alive. Maternal aunt passed at 62 years old unknown etiology. Uncle passed at 59 of unknown etiology. Other aunt passed at 63 due to fall and brain bleed. Brother has LDS and ?Sticklers disease    Discussed history of patient herself. She has been carrying longstanding history of POTS as well as migraines for which she is followed closely with neurology in [place]. She has had multiple brain scanning for which she's been diagnosed with intracranial vessel tortuosity. For POTS, she has been on Toprol 25 XL daily. She did try 12.5 mg bid short acting metoprolol but had severe symptoms in between doses. She feels close to passing out as soon as HR goes high and she gets extremely winded from tachycardia. HR goes up to 140s-180s at that time. She was told in the past she may need pacemaker.    Patient is an athlete currently - she plays competitive volleyball and also walks, uses eliptical machine, and does light weight lifting. She has no h/o pregnancy but wishes to have children in the future.      Easy bruising, arthritis, scoiliosis, hernia repair at 2, joint laxity, heavy periods,   Migraines had head/neck MRA     No complications with scoliosis surgery       PAST MEDICAL HISTORY  Past Medical History:   Diagnosis Date     Anxiety      Depression      Loeys-Dara syndrome      Migraine      POTS (postural orthostatic tachycardia syndrome)      Scoliosis     s/p lumbar spine fusion        CURRENT MEDICATIONS  Current Outpatient Prescriptions   Medication  "Sig Dispense Refill     cyclobenzaprine (FLEXERIL) 10 MG tablet Take 10 mg by mouth as needed.       Diclofenac Potassium (CATAFLAM) 50 MG TABS Take  by mouth as needed.       FLUoxetine HCl (PROZAC PO) Take 20 mg by mouth daily       GABAPENTIN PO Take 300 mg by mouth daily        metoprolol (TOPROL-XL) 25 MG 24 hr tablet Take 25 mg by mouth daily.       midodrine (PROAMATINE) 5 MG tablet Take 5 mg by mouth 3 times daily.       Multiple Vitamin (DAILY MULTIVITAMIN PO) Take  by mouth.       Zolpidem Tartrate (AMBIEN PO) Take 1.75 mg by mouth At Bedtime        Desogestrel-Ethinyl Estradiol (APRI PO) Take  by mouth daily.       Omeprazole 20 MG tablet Take 20 mg by mouth daily.         PAST SURGICAL HISTORY:  Past Surgical History:   Procedure Laterality Date     C LUMBAR SPINE FUSION,ANTER APPRCH  2000     HERNIORRHAPHY INGUINAL CHILD BILATERAL Bilateral        ALLERGIES     Allergies   Allergen Reactions     Amoxicillin Hives     Cephalexin Hcl Hives     Penicillins Hives     Adhesive Tape Rash     Compazine [Prochlorperazine] Anxiety and Other (See Comments)     akathisia       FAMILY HISTORY  No family history on file.    VASCULAR FAMILY HISTORY  1st order relative with atherosclerotic PAD: {YES / NO:521861::\"Yes\"}  1st order relative with AAA: {YES / NO:043183::\"Yes\"}    SOCIAL HISTORY  Social History     Social History     Marital status:      Spouse name: N/A     Number of children: N/A     Years of education: N/A     Occupational History     Not on file.     Social History Main Topics     Smoking status: Never Smoker     Smokeless tobacco: Never Used     Alcohol use Yes     Drug use: No     Sexual activity: Yes     Birth control/ protection: IUD     Other Topics Concern     Not on file     Social History Narrative       ROS:   Constitutional: No fever, chills, or sweats. No weight gain/loss   ENT: No visual disturbance, ear ache, epistaxis, sore throat  Allergies/Immunologic: Negative  Respiratory: No " "cough, hemoptysia  Cardiovascular: As per HPI  GI: No nausea, vomiting, hematemesis, melena, or hematochezia  : No urinary frequency, dysuria, or hematuria  Integument: Negative  Psychiatric: Negative  Neuro: Negative  Endocrinology: Negative   Musculoskeletal: Negative  Vascular: No walking impairment, claudication, ischemic rest pain or nonhealing wounds    EXAM:  /72 (BP Location: Left arm, Patient Position: Chair, Cuff Size: Adult Regular)  Pulse 54  Ht 1.765 m (5' 9.5\")  Wt 67.2 kg (148 lb 1.6 oz)  SpO2 98%  BMI 21.56 kg/m2  In general, the patient is a pleasant female in no apparent distress.    HEENT: NC/AT.  PERRLA.  EOMI.  Sclerae white, not injected.  Nares clear.  Pharynx without erythema or exudate.  Dentition intact.    Neck: No adenopathy.  No thyromegaly. Carotids +2/2 bilaterally without bruits.  No jugular venous distension.   Heart: RRR. Normal S1, S2 splits physiologically. No murmur, rub, click, or gallop. The PMI is in the 5th ICS in the midclavicular line. There is no heave.    Lungs: CTA.  No ronchi, wheezes, rales.  No dullness to percussion.   Abdomen: Soft, nontender, nondistended. No organomegaly. No AAA.  No bruits.   Extremities: No clubbing, cyanosis, or edema.  No wounds. No varicose veins signs of chronic venous insufficiency.   Vascular: No bruits are noted.   Brachial Radial Ulnar Femoral Popliteal DP PT   Left ***/2 ***/2 ***/2 ***/2 ***/2 ***/2 ***/2   Right ***/2 ***/2 ***/2 ***/2 ***/2 ***/2 ***/2     Labs:  LIPID RESULTS:  No results found for: CHOL, HDL, LDL, TRIG, CHOLHDLRATIO, NHDL    LIVER ENZYME RESULTS:  No results found for: AST, ALT    CBC RESULTS:  Lab Results   Component Value Date    HGB 12.3 04/01/2014       BMP RESULTS:  No results found for: NA, POTASSIUM, CHLORIDE, CO2, ANIONGAP, GLC, BUN, CR, GFRESTIMATED, GFRESTBLACK, EILEEN     A1C RESULTS:  No results found for: A1C    Procedures:    20-30 mmHg GCS   MRA/MRV abdomen/pelvis LE run off  UE duplex " arterial U/S   6 months follow up - same day cardiac MRI/MRA chest     Will do venous comp if neg MRA -multihance   Midodrine 5 mg bid   Continue toprol 25 xl       Assessment and Plan:     20-30 mmHg GCS   MRA/MRV abdomen/pelvis LE run off  UE duplex arterial U/S   6 months follow up - same day cardiac MRI/MRA chest     Will do venous comp if neg MRA -multihance   Midodrine 5 mg bid   Continue toprol 25 xl       CC: Pat - Dr. Horn - PCP, Dr. Martin - general cardiologist, Dr. Munoz - EP

## 2018-05-08 NOTE — NURSING NOTE
Chief Complaint   Patient presents with     New Patient     27 yr old female with PMH significant for Loey's-Dara with bradycardia presenting for evaluation        Cardiac Testing: Patient given instructions regarding  CMRI/MRA, venogram and arterial US. Discussed purpose, preparation, procedure and when to expect results reported back to the patient. Patient demonstrated understanding of this information and agreed to call with further questions or concerns.  Med Reconcile: Reviewed and verified all current medications with the patient. The updated medication list was printed and given to the patient.  Return Appointment: Patient given instructions regarding scheduling next clinic visit. Patient demonstrated understanding of this information and agreed to call with further questions or concerns.  Patient stated she understood all health information given and agreed to call with further questions or concerns.   Vlad Mendiola RN, BSN  Cardiology Care Coordinator  [facility]  [email or web address]  [phone]

## 2018-05-08 NOTE — PATIENT INSTRUCTIONS
You were seen today in the Adult Congenital and Cardiovascular Genetics Clinic at the [facility].    Cardiology Providers you saw during your visit:  Dr. Stephany Noble    Diagnosis:  Olivia Diamond, POTS    Results:  No testing today    Recommendations:    1.  Continue to eat a heart healthy diet.  2.  Continue to get 20-30 minutes of aerobic activity, 4-5 days per week. Please follow recommended exercise guidelines as discussed in your appointment.    3.  Continue to observe good oral hygiene, with regular dental visits.  4.  Please have a venogram of your lower extremities.  We will call you with the results.   5. We will get you a prescription for compression stockings.   6. No medication changes today.     Cardiac MRI  Magnetic resonance imaging (MRI) is a test that lets your doctor see detailed pictures of the inside of your body. MRI combines the use of strong magnets and radio waves to form an MRI image. A Cardiac MRI will give a detailed image of your heart.  Follow these instructions:  1. Please no eating or drinking 3 hours prior to the procedure.   2. No caffeine, alcohol or tobacco 12 hours prior to the procedure,    During the procedure:  1.Please arrive to the [facility] Waiting Room in the [facility].   2. You will be required to lay flat and follow breath-hold instructions.   3. You will need to remove all metal and answer a safety questionnaire. Please wear clothes without metal (snaps and zippers). Please remove any body piercings and hair extensions before you arrive. You will also remove watches, jewelry, hairpins, wallets, dentures, partial dental plates and hearing aids. You may wear contact lenses, and you may be able to wear your rings. We have a safe place to keep your personal items, but it is safer to leave them at home.  4. You will be given IV contrast for this exam.  To prepare:  The day before the exam drink extra fluid - at least six 8oz glasses (unless you are on a fluid  "restriction per your doctor)       Vitals:    05/08/18 1226   BP: 110/72   BP Location: Left arm   Patient Position: Chair   Cuff Size: Adult Regular   Pulse: 54   SpO2: 98%   Weight: 67.2 kg (148 lb 1.6 oz)   Height: 1.765 m (5' 9.5\")       Exercise restrictions:   Yes__X__  No____         If yes, list restrictions:  Please follow Recommendations for the Acceptability of Recreational Sports Activities and Exercise in Patients Handout      Work restrictions:  Yes____  No____         If yes, list restrictions:    FASTING CHOLESTEROL was checked in the last 5 years YES___  NO___   Continue to eat a heart healthy, low salt diet.         ____ Fasting lipid panel order today         ____ No changes in medications          ____ I recommend the following changes in your cholesterol medications.:          ____ Please follow up for cholesterol screening at your primary care physician      Follow-up:  Follow with Dr. Noble in 6 months with a cardiac MRI/ chest MRA and arterial ultrasound of your upper extremities.     For after hours urgent needs, call [phone] and ask to speak to the Adult Congenital Physician on call.  Mention Job Code 0401.    For emergencies call 911.    For any scheduling needs and to contact your nurse in the Adult Congenital and Cardiovascular Genetics Clinic, please call [facility] Te[facility] Procedure , at [phone].    Thank you for your visit today!  If you have questions or concerns about today's visit, please call me.    Vlad Mendiola RN, BSN  Cardiology Care Coordinator  [facility]  [email or web address]  [phone]    [facility]  [facility]   Clinics and Surgery Center  Mail Code [id number]  [address] [place] [address]  [phone]   "

## 2018-05-08 NOTE — MR AVS SNAPSHOT
After Visit Summary   5/8/2018    Day Merlos    MRN: [id number]           Patient Information     Date Of Birth          1990        Visit Information        Provider Department      5/8/2018 12:30 PM Stephany Noble MD [facility]        Today's Diagnoses     Loeys-Dara syndrome type 4    -  1    POTS (postural orthostatic tachycardia syndrome)          Care Instructions    You were seen today in the Adult Congenital and Cardiovascular Genetics Clinic at the [facility].    Cardiology Providers you saw during your visit:  Dr. Stephany Noble    Diagnosis:  Lomallory's Dara, POTS    Results:  No testing today    Recommendations:    1.  Continue to eat a heart healthy diet.  2.  Continue to get 20-30 minutes of aerobic activity, 4-5 days per week. Please follow recommended exercise guidelines as discussed in your appointment.    3.  Continue to observe good oral hygiene, with regular dental visits.  4.  Please have a venogram of your lower extremities.  We will call you with the results.   5. We will get you a prescription for compression stockings.   6. No medication changes today.     Cardiac MRI  Magnetic resonance imaging (MRI) is a test that lets your doctor see detailed pictures of the inside of your body. MRI combines the use of strong magnets and radio waves to form an MRI image. A Cardiac MRI will give a detailed image of your heart.  Follow these instructions:  1. Please no eating or drinking 3 hours prior to the procedure.   2. No caffeine, alcohol or tobacco 12 hours prior to the procedure,    During the procedure:  1.Please arrive to the [facility] Waiting Room in the [facility].   2. You will be required to lay flat and follow breath-hold instructions.   3. You will need to remove all metal and answer a safety questionnaire. Please wear clothes without metal (snaps and zippers). Please remove any body piercings and hair extensions before you arrive. You will  "also remove watches, jewelry, hairpins, wallets, dentures, partial dental plates and hearing aids. You may wear contact lenses, and you may be able to wear your rings. We have a safe place to keep your personal items, but it is safer to leave them at home.  4. You will be given IV contrast for this exam.  To prepare:  The day before the exam drink extra fluid - at least six 8oz glasses (unless you are on a fluid restriction per your doctor)       Vitals:    05/08/18 1226   BP: 110/72   BP Location: Left arm   Patient Position: Chair   Cuff Size: Adult Regular   Pulse: 54   SpO2: 98%   Weight: 67.2 kg (148 lb 1.6 oz)   Height: 1.765 m (5' 9.5\")       Exercise restrictions:   Yes__X__  No____         If yes, list restrictions:  Please follow Recommendations for the Acceptability of Recreational Sports Activities and Exercise in Patients Handout      Work restrictions:  Yes____  No____         If yes, list restrictions:    FASTING CHOLESTEROL was checked in the last 5 years YES___  NO___   Continue to eat a heart healthy, low salt diet.         ____ Fasting lipid panel order today         ____ No changes in medications          ____ I recommend the following changes in your cholesterol medications.:          ____ Please follow up for cholesterol screening at your primary care physician      Follow-up:  Follow with Dr. Noble in 6 months with a cardiac MRI/ chest MRA and arterial ultrasound of your upper extremities.     For after hours urgent needs, call [phone] and ask to speak to the Adult Congenital Physician on call.  Mention Job Code 0401.    For emergencies call 911.    For any scheduling needs and to contact your nurse in the Adult Congenital and Cardiovascular Genetics Clinic, please call Donn Tejeda, Procedure , at [phone].    Thank you for your visit today!  If you have questions or concerns about today's visit, please call me.    Vlad Mendiola, RN, BSN  Cardiology Care " Coordinator  [facility]  [email or web address]  [phone]    [facility]  [facility]  Mail Code [id number]  [address] [place]  [address]           Follow-ups after your visit        Your next 10 appointments already scheduled     May 22, 2018  1:00 PM CDT   MR LEG RUN OFF ANGIOGRAM with UUMR1   [facility], [place], [facility] ([facility], [facility])    [address] [address] [phone]           Take your medicines as usual, unless your doctor tells you not to. Bring a list of your current medicines to your exam (including vitamins, minerals and over-the-counter drugs).  You may or may not receive intravenous (IV) contrast for this exam pending the discretion of the Radiologist.  You do not need to do anything special to prepare.  The MRI machine uses a strong magnet. Please wear clothes without metal (snaps, zippers). A sweatsuit works well, or we may give you a hospital gown.  Please remove any body piercings and hair extensions before you arrive. You will also remove watches, jewelry, hairpins, wallets, dentures, partial dental plates and hearing aids. You may wear contact lenses, and you may be able to wear your rings. We have a safe place to keep your personal items, but it is safer to leave them at home.  **IMPORTANT** THE INSTRUCTIONS BELOW ARE ONLY FOR THOSE PATIENTS WHO HAVE BEEN PRESCRIBED SEDATION OR GENERAL ANESTHESIA DURING THEIR MRI PROCEDURE:  IF YOUR DOCTOR PRESCRIBED ORAL SEDATION (take medicine to help you relax during your exam):   You must get the medicine from your doctor (oral medication) before you arrive. Bring the medicine to the exam. Do not take it at home. You ll be told when to take it upon arriving for your exam.   Arrive one hour early. Bring someone who can take you home after the test. Your  medicine will make you sleepy. After the exam, you may not drive, take a bus or take a taxi by yourself.  IF YOUR DOCTOR PRESCRIBED IV SEDATION:   Arrive one hour early. Bring someone who can take you home after the test. Your medicine will make you sleepy. After the exam, you may not drive, take a bus or take a taxi by yourself.   No eating 6 hours before your exam. You may have clear liquids up until 4 hours before your exam. (Clear liquids include water, clear tea, black coffee and fruit juice without pulp.)  IF YOUR DOCTOR PRESCRIBED ANESTHESIA (be asleep for your exam):   Arrive 1 1/2 hours early. Bring someone who can take you home after the test. You may not drive, take a bus or take a taxi by yourself.   No eating 8 hours before your exam. You may have clear liquids up until 4 hours before your exam. (Clear liquids include water, clear tea, black coffee and fruit juice without pulp.)   You will spend four to five hours in the recovery room.  Please call the Imaging Department at your exam site with any questions.            Nov 13, 2018  7:00 AM CST   MR CARDIAC W CONTRAST W FLOW QUANT with UUMR4, UU CV MR NURSE   [facility], [place], [facility] ([facility], [facility])    [address] [address] [phone]           Take your medicines as usual, unless your doctor tells you not to.   If you take Viagra, Levitra or Cialis, stop taking it 48 hours before your test.   If you take Aggrenox or dipyridamole (Persantine, Permole), stop taking it 48 hours before your test.   If you take Theophylline or Aminophylline, stop taking it 12 hours before your test.   If you are diabetic and take oral hypoglycemics, do not take them on the day of your test.  The day before your exam, drink extra fluids at least six 8-ounce glasses (unless your doctor wants you to limit your fluids).  Stop all caffeine 12 hours before the test. This includes coffee, tea, soda,  chocolate and certain medicines (such as Anacin, Excedrin and NoDoz). Also avoid decaf coffee and tea, as these contain small amounts of caffeine.  Do not eat or drink for 3 hours before your exam. You may drink water and take your morning medicines.  You may need a blood test (creatinine test) within 30 days of your exam. Follow your doctor s orders if this is arranged before your exam.  If you are very claustrophobic, please let you doctor know. You may get a sedative medicine from your doctor before you arrive. Bring the medicine to the exam. Do not take it at home. Arrive one hour early. Bring someone who can take you home after the test. Your medicine will make you sleepy. After the exam, you may not drive, take a bus or take a taxi by yourself.  The MRI machine uses a strong magnet. Please wear clothes without metal (snaps, zippers). A sweatsuit works well, or we may give you a hospital gown.  Please remove any body piercings and hair extensions before you arrive. You will remove watches, jewelry, hairpins, wallets, dentures, partial dental plates and hearing aids. You may wear contact lenses, and you may be able to wear your rings. We have a safe place to keep your personal items, but it is safer to leave them at home.  **IMPORTANT** THE INSTRUCTIONS BELOW ARE ONLY FOR THOSE PATIENTS WHO HAVE BEEN PRESCRIBED SEDATION OR GENERAL ANESTHESIA DURING THEIR MRI PROCEDURE:  IF YOUR DOCTOR PRESCRIBED ORAL SEDATION (take medicine to help you relax during your exam):   You must get the medicine from your doctor (oral medication) before you arrive. Bring the medicine to the exam. Do not take it at home. You ll be told when to take it upon arriving for your exam.   Arrive one hour early. Bring someone who can take you home after the test. Your medicine will make you sleepy. After the exam, you may not drive, take a bus or take a taxi by yourself.  IF YOUR DOCTOR PRESCRIBED IV SEDATION:   Arrive one hour early. Bring  someone who can take you home after the test. Your medicine will make you sleepy. After the exam, you may not drive, take a bus or take a taxi by yourself.   No eating 6 hours before your exam. You may have clear liquids up until 4 hours before your exam. (Clear liquids include water, clear tea, black coffee and fruit juice without pulp.)  IF YOUR DOCTOR PRESCRIBED ANESTHESIA (be asleep for your exam):   Arrive 1 1/2 hours early. Bring someone who can take you home after the test. You may not drive, take a bus or take a taxi by yourself.   No eating 8 hours before your exam. You may have clear liquids up until 4 hours before your exam. (Clear liquids include water, clear tea, black coffee and fruit juice without pulp.)   You will spend four to five hours in the recovery room.  If you have any questions, please contact your Imaging Department exam site.            Nov 13, 2018  8:45 AM CST   MR CHEST W/O & W CONTRAST ANGIOGRAM with UUMR4   [facility], [place], [facility] ([facility], [facility])    [address] [facility] [id number] [phone]           Take your medicines as usual, unless your doctor tells you not to. Bring a list of your current medicines to your exam (including vitamins, minerals and over-the-counter drugs).  You may or may not receive intravenous (IV) contrast for this exam pending the discretion of the Radiologist.  You do not need to do anything special to prepare.  The MRI machine uses a strong magnet. Please wear clothes without metal (snaps, zippers). A sweatsuit works well, or we may give you a hospital gown.  Please remove any body piercings and hair extensions before you arrive. You will also remove watches, jewelry, hairpins, wallets, dentures, partial dental plates and hearing aids. You may wear contact lenses, and you may be able to wear your rings. We have a safe place to keep your personal items, but it is safer to leave them at home.   **IMPORTANT** THE INSTRUCTIONS BELOW ARE ONLY FOR THOSE PATIENTS WHO HAVE BEEN PRESCRIBED SEDATION OR GENERAL ANESTHESIA DURING THEIR MRI PROCEDURE:  IF YOUR DOCTOR PRESCRIBED ORAL SEDATION (take medicine to help you relax during your exam):   You must get the medicine from your doctor (oral medication) before you arrive. Bring the medicine to the exam. Do not take it at home. You ll be told when to take it upon arriving for your exam.   Arrive one hour early. Bring someone who can take you home after the test. Your medicine will make you sleepy. After the exam, you may not drive, take a bus or take a taxi by yourself.  IF YOUR DOCTOR PRESCRIBED IV SEDATION:   Arrive one hour early. Bring someone who can take you home after the test. Your medicine will make you sleepy. After the exam, you may not drive, take a bus or take a taxi by yourself.   No eating 6 hours before your exam. You may have clear liquids up until 4 hours before your exam. (Clear liquids include water, clear tea, black coffee and fruit juice without pulp.)  IF YOUR DOCTOR PRESCRIBED ANESTHESIA (be asleep for your exam):   Arrive 1 1/2 hours early. Bring someone who can take you home after the test. You may not drive, take a bus or take a taxi by yourself.   No eating 8 hours before your exam. You may have clear liquids up until 4 hours before your exam. (Clear liquids include water, clear tea, black coffee and fruit juice without pulp.)   You will spend four to five hours in the recovery room.  Please call the Imaging Department at your exam site with any questions.            Nov 13, 2018 10:30 AM CST   US UPPER EXTREMITY ARTERIAL DUPLEX BILATERAL with UCUSV1   [facility]  ([facility])    [address] [address] [phone]           Please bring a list of your medicines (including vitamins, minerals and over-the-counter drugs). Also, tell your doctor about any allergies  "you may have. Wear comfortable clothes and leave your valuables at home.  You do not need to do anything special to prepare for your exam.  Please call the Imaging Department at your exam site with any questions.            Nov 13, 2018 12:00 PM CST   (Arrive by 11:45 AM)   New Vascular Genetics with Stephany Noble MD   [facility] ([facility])    [address] [place] [address]  [address] [place] [address] [phone]              Future tests that were ordered for you today     Open Future Orders        Priority Expected Expires Ordered    MRI Angiogram chest w & w/o contrast Routine 11/9/2018 5/8/2019 5/8/2018    MRI Cardiac w/contrast and flow Routine 5/9/2018 5/8/2019 5/8/2018    US Upper Ext Arterial Duplex Bilateral Routine  5/8/2019 5/8/2018    MRA Leg run off (Abdominal Aorta & LE Run off Evaluation) Routine  5/8/2019 5/8/2018            Who to contact     If you have questions or need follow up information about today's clinic visit or your schedule please contact [facility] directly at [phone].  Normal or non-critical lab and imaging results will be communicated to you by Mydishhart, letter or phone within 4 business days after the clinic has received the results. If you do not hear from us within 7 days, please contact the clinic through GIVTEDt or phone. If you have a critical or abnormal lab result, we will notify you by phone as soon as possible.  Submit refill requests through CloudShare or call your pharmacy and they will forward the refill request to us. Please allow 3 business days for your refill to be completed.          Additional Information About Your Visit        Mydish[employer] Information     CloudShare lets you send messages to your doctor, view your test results, renew your prescriptions, schedule appointments and more. To sign up, go to [email or web address] . Click on \"Log in\" on the left side of the screen, which will take you to the Welcome page. " "Then click on \"Sign up Now\" on the right side of the page.     You will be asked to enter the access code listed below, as well as some personal information. Please follow the directions to create your username and password.     Your access code is: [email or web address]  Expires: 2018  3:53 PM     Your access code will  in 90 days. If you need help or a new code, please call your [place] clinic or [phone].        Care EveryWhere ID     This is your Care EveryWhere ID. This could be used by other organizations to access your [facility] medical records  [id number]        Your Vitals Were     Pulse Height Pulse Oximetry BMI (Body Mass Index)          54 1.765 m (5' 9.5\") 98% 21.56 kg/m2         Blood Pressure from Last 3 Encounters:   18 110/72   12 110/70    Weight from Last 3 Encounters:   18 67.2 kg (148 lb 1.6 oz)   12 61.3 kg (135 lb 2.3 oz)              We Performed the Following     EKG 12-lead, tracing only (Same Day)          Today's Medication Changes          These changes are accurate as of 18  2:13 PM.  If you have any questions, ask your nurse or doctor.               Start taking these medicines.        Dose/Directions    COMPRESSION STOCKINGS   Used for:  Loeys-Dara syndrome type 4   Started by:  Stephany Noble MD        Dose:  1 each   1 each daily   Quantity:  1 each   Refills:  1            Where to get your medicines      Some of these will need a paper prescription and others can be bought over the counter.  Ask your nurse if you have questions.     Bring a paper prescription for each of these medications     COMPRESSION STOCKINGS                Primary Care Provider Office Phone # Fax #    Jennifer Peraza [phone]       [facility] [address] [address]        Equal Access to Services     EMANUEL ZIMMERMAN AH: Claudia marino Soomaali, waaxda luqadaha, qaybta kaalmajose luis judd, erika crandall " oscar laLuz Elenaaan ah. So [facility] [phone].    ATENCIÓN: Si habla emily, tiene a bland disposición servicios gratuitos de asistencia lingüística. Aixa al [phone].    We comply with applicable federal civil rights laws and [place] laws. We do not discriminate on the basis of race, color, national origin, age, disability, sex, sexual orientation, or gender identity.            Thank you!     Thank you for choosing [facility]  for your care. Our goal is always to provide you with excellent care. Hearing back from our patients is one way we can continue to improve our services. Please take a few minutes to complete the written survey that you may receive in the mail after your visit with us. Thank you!             Your Updated Medication List - Protect others around you: Learn how to safely use, store and throw away your medicines at www.disposemymeds.org.          This list is accurate as of 5/8/18  2:13 PM.  Always use your most recent med list.                   Brand Name Dispense Instructions for use Diagnosis    AMBIEN PO      Take 1.75 mg by mouth At Bedtime        APRI PO      Take  by mouth daily.    Familial aortic aneurysm, Thin skin, Joint hyperextensibility of multiple sites       CATAFLAM 50 MG Tabs   Generic drug:  Diclofenac Potassium      Take  by mouth as needed.    Familial aortic aneurysm, Thin skin, Joint hyperextensibility of multiple sites       COMPRESSION STOCKINGS     1 each    1 each daily    Loeys-Dara syndrome type 4       DAILY MULTIVITAMIN PO      Take  by mouth.    Familial aortic aneurysm, Thin skin, Joint hyperextensibility of multiple sites       FLEXERIL 10 MG tablet   Generic drug:  cyclobenzaprine      Take 10 mg by mouth as needed.    Familial aortic aneurysm, Thin skin, Joint hyperextensibility of multiple sites       GABAPENTIN PO      Take 300 mg by mouth daily    Familial aortic aneurysm, Thin skin, Joint hyperextensibility of multiple sites       metoprolol  succinate 25 MG 24 hr tablet    TOPROL-XL     Take 25 mg by mouth daily.    Familial aortic aneurysm, Thin skin, Joint hyperextensibility of multiple sites       midodrine 5 MG tablet    PROAMATINE     Take 5 mg by mouth 3 times daily.    Familial aortic aneurysm, Thin skin, Joint hyperextensibility of multiple sites       omeprazole 20 MG tablet      Take 20 mg by mouth daily.    Familial aortic aneurysm, Thin skin, Joint hyperextensibility of multiple sites       PROZAC PO      Take 20 mg by mouth daily

## 2018-05-09 LAB — INTERPRETATION ECG - MUSE: NORMAL

## 2018-05-22 ENCOUNTER — HOSPITAL ENCOUNTER (OUTPATIENT)
Dept: MRI IMAGING | Facility: CLINIC | Age: 28
Discharge: HOME | End: 2018-05-22
Attending: INTERNAL MEDICINE | Admitting: INTERNAL MEDICINE
Payer: COMMERCIAL

## 2018-05-22 DIAGNOSIS — Q87.89 LOEYS-DIETZ SYNDROME TYPE 4: ICD-10-CM

## 2018-05-22 PROCEDURE — 25500064 ZZH RX 255 OP 636: Performed by: INTERNAL MEDICINE

## 2018-05-22 PROCEDURE — A9577 INJ MULTIHANCE: HCPCS | Performed by: INTERNAL MEDICINE

## 2018-05-22 PROCEDURE — 73725 MR ANG LWR EXT W OR W/O DYE: CPT | Mod: 50

## 2018-05-22 RX ADMIN — GADOBENATE DIMEGLUMINE 15 ML: 529 INJECTION, SOLUTION INTRAVENOUS at 14:28

## 2018-05-27 NOTE — PROGRESS NOTES
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    Family Genetics History:   Mother: h/o mitral and aortic valve replacement at early age (diagnosed with valve disease in high school).  at 54 yrs old from ?thrombosed mitral valve replacement and subsequent congestive heart failure. Father is healthy and alive. Maternal aunt passed at 62 years old unknown etiology. Maternal uncle passed at 59 of unknown etiology. Other aunt passed at 63 due to fall and brain bleed. Brother has LDS. Patient has no children but plans on getting pregnant in some time.     Discussed history of patient herself. She is from [facility] and has most of her care up there, primarily pediatric neurology and family practice. She has been carrying longstanding history of POTS as well as migraines for which she is followed closely with neurology in [place]. She has been getting intermittent brain/neck MRA for known cerebral tortuosity (details unclear - available imaging through care everywhere reveals only normal CT neck angiogram in  and MRA without contrast? In ). Imaging she's had of her chest demonstrated normal caliber aortic root and ascending aorta. Echocardiogram has report of normal aortic valve and mitral valve from 2009.    For POTS, she has been on Toprol 25 XL daily. She did try 12.5 mg bid short acting metoprolol but had severe symptoms in between doses. She feels close to passing out as soon as HR goes high and she gets extremely winded from tachycardia. HR goes up to 140s-180s at that time. She was told in the past she may need pacemaker. She has never taken midodrine. Last tilt table test was in  that showed HR in the 120s at 70 degree tilt within 5 minutes and subsequent drop in blood pressure.     Patient is an athlete  currently - she plays competitive volleyball and also walks, uses [employer] machine, and does light weight lifting. She has no h/o pregnancy but wishes to have children in the future.    On ROS she describes the following: high arch palate (no bifid uvula or cleft palate), easy bruising, arthritis, scoiliosis, hernia repair at 2, joint laxity, heavy periods, migraines and neck pain. No lens dislocation, uvula deformity, chest wall deformity. She had scoliosis surgery when she was younger with no complications per her recollection. Also reports poor circulation of her feet and has been noted to have absent pulses. Also describes upper extremity poor circulation and reynauds. Also has varicose veins.    PAST MEDICAL HISTORY  Past Medical History:   Diagnosis Date     Anxiety      Depression      Loeys-Dara syndrome      Migraine      POTS (postural orthostatic tachycardia syndrome)      Scoliosis     s/p lumbar spine fusion        CURRENT MEDICATIONS  Current Outpatient Prescriptions   Medication Sig Dispense Refill     cyclobenzaprine (FLEXERIL) 10 MG tablet Take 10 mg by mouth as needed.       Diclofenac Potassium (CATAFLAM) 50 MG TABS Take  by mouth as needed.       FLUoxetine HCl (PROZAC PO) Take 20 mg by mouth daily       GABAPENTIN PO Take 300 mg by mouth daily        metoprolol (TOPROL-XL) 25 MG 24 hr tablet Take 25 mg by mouth daily.       midodrine (PROAMATINE) 5 MG tablet Take 5 mg by mouth 3 times daily.       Multiple Vitamin (DAILY MULTIVITAMIN PO) Take  by mouth.       Zolpidem Tartrate (AMBIEN PO) Take 1.75 mg by mouth At Bedtime        Desogestrel-Ethinyl Estradiol (APRI PO) Take  by mouth daily.       Omeprazole 20 MG tablet Take 20 mg by mouth daily.         PAST SURGICAL HISTORY:  Past Surgical History:   Procedure Laterality Date     C LUMBAR SPINE FUSION,ANTER APPRCH  2000     HERNIORRHAPHY INGUINAL CHILD BILATERAL Bilateral        ALLERGIES     Allergies   Allergen Reactions     Amoxicillin  "Hives     Cephalexin Hcl Hives     Penicillins Hives     Adhesive Tape Rash     Compazine [Prochlorperazine] Anxiety and Other (See Comments)     akathisia       FAMILY HISTORY  No family history on file.    VASCULAR FAMILY HISTORY  1st order relative with atherosclerotic PAD: unknown  1st order relative with AAA: unknown    SOCIAL HISTORY  Social History     Social History     Marital status:      Spouse name: N/A     Number of children: N/A     Years of education: N/A     Occupational History     Not on file.     Social History Main Topics     Smoking status: Never Smoker     Smokeless tobacco: Never Used     Alcohol use Yes     Drug use: No     Sexual activity: Yes     Birth control/ protection: IUD     Other Topics Concern     Not on file     Social History Narrative       ROS:   Constitutional: No fever, chills, or sweats. No weight gain/loss   ENT: No visual disturbance, ear ache, epistaxis, sore throat  Allergies/Immunologic: Negative  Respiratory: No cough, hemoptysia  Cardiovascular: As per HPI  GI: No nausea, vomiting, hematemesis, melena, or hematochezia  : No urinary frequency, dysuria, or hematuria  Integument: Negative  Psychiatric: Negative  Neuro: Negative  Endocrinology: Negative   Musculoskeletal: Negative  Vascular: No walking impairment, claudication, ischemic rest pain or nonhealing wounds    EXAM:  /72 (BP Location: Left arm, Patient Position: Chair, Cuff Size: Adult Regular)  Pulse 54  Ht 1.765 m (5' 9.5\")  Wt 67.2 kg (148 lb 1.6 oz)  SpO2 98%  BMI 21.56 kg/m2  In general, the patient is a pleasant female in no apparent distress.    HEENT: NC/AT.  PERRLA.  EOMI.  Sclerae white, not injected.  Nares clear.  Pharynx without erythema or exudate.  Dentition intact.   Neck: No adenopathy.  No thyromegaly. Carotids +2/2 bilaterally without bruits.  No jugular venous distension.   Heart: RRR. Normal S1, S2 splits physiologically. No murmur, rub, click, or gallop. The PMI is in the " 5th ICS in the midclavicular line. There is no heave.    Lungs: CTA.  No ronchi, wheezes, rales.  No dullness to percussion.   Abdomen: Soft, nontender, nondistended. No organomegaly. No AAA.  No bruits.   Extremities: No clubbing, cyanosis. No wounds. Trace edema BL with +varicosities.    Vascular: No bruits are noted. Absent DP pulses bilaterally with 2+ PT, 2+ radial, 2+ carotid, 2+ femoral.     Labs:  LIPID RESULTS:  No results found for: CHOL, HDL, LDL, TRIG, CHOLHDLRATIO, NHDL    LIVER ENZYME RESULTS:  No results found for: AST, ALT    CBC RESULTS:  Lab Results   Component Value Date    HGB 12.3 04/01/2014       BMP RESULTS:  No results found for: NA, POTASSIUM, CHLORIDE, CO2, ANIONGAP, GLC, BUN, CR, GFRESTIMATED, GFRESTBLACK, EILEEN     A1C RESULTS:  No results found for: A1C    Procedures:      CT ANGIO CAROTID    CLINICAL: Vertebral artery dissection.    TECHNICAL: Omnipaque-300 80 mL IV.    FINDINGS: Degraded examination secondary to motion. Both vertebral  arteries are patent. They are visualized to the level the basilar  artery and intracranial circulation. Both common carotid arteries are  patent. ECAs and ICAs are patent bilaterally. Left subclavian artery  and right brachiocephalic and subclavian arteries appear patent.  Patent anterior and posterior circulation within the brain to the  level of the [place].    IMPRESSION: Negative examination.    Tilt Table (2009)   Blood pressure at rest was 109/74, heart rate 74 beats a minute. After tilting    up the table to 70 degrees, blood pressure first went up to 102/74 and heart    rate 107 beats a minute and then later the heart rate was 91/71 and pulse rate    was 126 beats a minute at 5 minutes.      The test was terminated then. The patient had no shortness of breath but had    chest pain. After bringing the table back to 0 degrees the heart rate    continues to be in the 50s. Blood pressure did not change.     Chest CTPE 2009    Signs and  Symptoms for Radiological Exam from IDX:       * CHEST PAIN/SOB    CT CHEST FOR PULMONARY EMBOLI    TECHNICAL DATA: Omnipaque 300, 82 mL IV.    FINDINGS: Normal thoracic aorta. The pulmonary arterial tree is  normally opacified. No adenopathy. The lung fields are clear.    IMPRESSION: Normal evaluation.    Echocardiogram 2009  INTERPRETATION: An M-mode, 2-D, and Doppler echocardiogram was performed.      Left ventricular cavity size is normal. Wall thickness is normal. Regional and    global systolic function are normal. Left ventricular ejection fraction is    visually estimated to be 55%. Left atrium, right atrium, right ventricular    chambers are normal in size. The aortic root diameter is normal in size. The    aortic valve leaflets are normal in thickness and mobility with trace aortic    insufficiency. Mitral valve leaflets are normal in thickness and mobility with    trace mitral regurgitation. Tricuspid valve leaflets are normal in thickness    and mobility. There is no pericardial effusion. Distal aortic arch and    proximal descending thoracic aorta are normal on 2-D and Doppler    echocardiography.     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 She is here for referral to vascular cardiac genetics clinic. MSK involvement includes scoliosis, skin involvement +wound healing abnormalities, with no lens dislocation, or joint laxity. Cranio features include wideset eyes but no bifid uvula or cleft palate, although she has high arch palate. She reports abnormal tortuosity of intracranial vessels although unable to obtain documentation as carotid CTA and MR without contrast reported normal vessels. Echocardiogram demonstrates no significant valve disease although this was back in 2009.     She has ongoing issues including the following:    -venous insufficiency  -poor UE and LE circulation  -symptomatic POTS but on stable regimen of toprol XL 25    Given the unpredictable nature of vasculature in LDS (especially with associated craniofacial abnormalities), current AHA/ACC guidelines suggest yearly surveillance from intracranial vessels to pelvis. If she is to develop even borderline aortic dilation (ideal to report aortic diameter in Z score and index given her young age, tall height and need for following), early surgical intervention is warranted in these patients compared to other vascular syndromes.     She has not yet had abdomen/pelvis vasculature defined, therefore will obtain MRA/MRV abdomen/pelvis and capture LE vessels given her lack of LASHON pulses and poor circulation and also varicose veins and edema. If this is normal, will obtain routine MRA C/A/P without venogram yearly. In 6 months at same time as visit with me, will obtain the MRA chest and cardiac MRI to evaluate for valvular and aortic abnormalities.     UE duplex can be uptained with distal PPGs to evaluate her raynaud's phenomenon. Will prescribe her GCS 20-30 mmHg for mild venous insufficiency as she is on her feet often and if plans to get pregnant, this will be made worse. CVI is often seen in POTS as well as LDS.    For POTS, there is reported association with connective tissue disease, however with LDS this seems rare (more often EDS type III - hypermobile type and not vascular type IV). She seems to be most stable on toprol XL 25 daily. Offered to switch to short acting for additional titration however patient reports she has gone through multiple regimens and this seems to work best. At this time she has no syncope, no significant bradycardia therefore I do not see indication for pacemaker unless increasing need for higher dose beta blocker was necessary with subsequent drop in HR and BP. But as she describes, she is OK on current regimen. Again discussed lifestyle  modifications. Would avoid any agents that increase BP at this time given her vasculopathy therefore hesitant with mineralocorticoids but could consider if she gets persistently hypotensive. It may be worth in future to repeat her TILT on ideal beta blockade regimen.    Lastly, will need very careful monitoring if she plans to get pregnant and a co-management strategy with high risk MFM. Happy to continue to see patient around that time.    Will see patient back in 6 months. It was a pleasure taking part in the care of this patient.     Total time spent 90 minutes, of which >50% was spent in face-to-face patient evaluation, reviewing data with patient, and coordination of care.       Stephany Noble MD MSc  , Aortopathy Clinic  Division of Vascular Medicine and Congenital Heart Disease  [facility]

## 2018-06-11 DIAGNOSIS — I87.2 VENOUS (PERIPHERAL) INSUFFICIENCY: Primary | ICD-10-CM

## 2018-08-23 ENCOUNTER — TELEPHONE (OUTPATIENT)
Dept: CARDIOLOGY | Facility: CLINIC | Age: 28
End: 2018-08-23

## 2018-08-24 DIAGNOSIS — M24.80 JOINT HYPEREXTENSIBILITY OF MULTIPLE SITES: ICD-10-CM

## 2018-08-24 DIAGNOSIS — R23.4 THIN SKIN: ICD-10-CM

## 2018-08-24 DIAGNOSIS — Z82.49 FAMILIAL AORTIC ANEURYSM: ICD-10-CM

## 2018-08-24 RX ORDER — MIDODRINE HYDROCHLORIDE 5 MG/1
5 TABLET ORAL
Qty: 180 TABLET | Refills: 3 | Status: SHIPPED | OUTPATIENT
Start: 2018-08-24 | End: 2019-11-06

## 2018-11-13 ENCOUNTER — HOSPITAL ENCOUNTER (OUTPATIENT)
Dept: MRI IMAGING | Facility: CLINIC | Age: 28
Discharge: HOME | End: 2018-11-13
Attending: INTERNAL MEDICINE | Admitting: INTERNAL MEDICINE
Payer: COMMERCIAL

## 2018-11-13 ENCOUNTER — RADIANT APPOINTMENT (OUTPATIENT)
Dept: ULTRASOUND IMAGING | Facility: CLINIC | Age: 28
End: 2018-11-13
Attending: INTERNAL MEDICINE
Payer: COMMERCIAL

## 2018-11-13 ENCOUNTER — HOSPITAL ENCOUNTER (OUTPATIENT)
Dept: MRI IMAGING | Facility: CLINIC | Age: 28
End: 2018-11-13
Attending: INTERNAL MEDICINE
Payer: COMMERCIAL

## 2018-11-13 ENCOUNTER — OFFICE VISIT (OUTPATIENT)
Dept: CARDIOLOGY | Facility: CLINIC | Age: 28
End: 2018-11-13
Attending: INTERNAL MEDICINE
Payer: COMMERCIAL

## 2018-11-13 VITALS
HEIGHT: 70 IN | OXYGEN SATURATION: 98 % | SYSTOLIC BLOOD PRESSURE: 99 MMHG | HEART RATE: 92 BPM | DIASTOLIC BLOOD PRESSURE: 47 MMHG | BODY MASS INDEX: 20.87 KG/M2 | WEIGHT: 145.8 LBS

## 2018-11-13 DIAGNOSIS — G44.209 TENSION HEADACHE: Primary | ICD-10-CM

## 2018-11-13 DIAGNOSIS — Q87.89 LOEYS-DIETZ SYNDROME TYPE 4: ICD-10-CM

## 2018-11-13 DIAGNOSIS — G90.A POTS (POSTURAL ORTHOSTATIC TACHYCARDIA SYNDROME): ICD-10-CM

## 2018-11-13 DIAGNOSIS — Q87.89 LOEYS-DIETZ SYNDROME: ICD-10-CM

## 2018-11-13 PROCEDURE — G0463 HOSPITAL OUTPT CLINIC VISIT: HCPCS | Mod: ZF

## 2018-11-13 PROCEDURE — 75565 CARD MRI VELOC FLOW MAPPING: CPT

## 2018-11-13 PROCEDURE — 75565 CARD MRI VELOC FLOW MAPPING: CPT | Mod: 26 | Performed by: INTERNAL MEDICINE

## 2018-11-13 PROCEDURE — A9585 GADOBUTROL INJECTION: HCPCS | Performed by: INTERNAL MEDICINE

## 2018-11-13 PROCEDURE — 75561 CARDIAC MRI FOR MORPH W/DYE: CPT | Mod: 26 | Performed by: INTERNAL MEDICINE

## 2018-11-13 PROCEDURE — 71555 MRI ANGIO CHEST W OR W/O DYE: CPT | Mod: 26 | Performed by: INTERNAL MEDICINE

## 2018-11-13 PROCEDURE — 99215 OFFICE O/P EST HI 40 MIN: CPT | Mod: GC | Performed by: INTERNAL MEDICINE

## 2018-11-13 PROCEDURE — 25500064 ZZH RX 255 OP 636: Performed by: INTERNAL MEDICINE

## 2018-11-13 PROCEDURE — 71555 MRI ANGIO CHEST W OR W/O DYE: CPT

## 2018-11-13 RX ORDER — GADOBUTROL 604.72 MG/ML
10 INJECTION INTRAVENOUS ONCE
Status: COMPLETED | OUTPATIENT
Start: 2018-11-13 | End: 2018-11-13

## 2018-11-13 RX ADMIN — GADOBUTROL 10 ML: 604.72 INJECTION INTRAVENOUS at 07:34

## 2018-11-13 ASSESSMENT — PAIN SCALES - GENERAL: PAINLEVEL: NO PAIN (0)

## 2018-11-13 NOTE — LETTER
2018      RE: Day Merlos  [address] [facility] [id number]       Dear Colleague,    Thank you for the opportunity to participate in the care of your patient, Day Merlos, at the [facility] HEART [facility] at [facility]. Please see a copy of my visit note below.         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       Prior History:     Mother: h/o mitral and aortic valve replacement at early age (diagnosed with valve disease in high school).  at 54 yrs old from ?thrombosed mitral valve replacement and subsequent congestive heart failure. Father is healthy and alive. Maternal aunt passed at 62 years old unknown etiology. Maternal uncle passed at 59 of unknown etiology. Other aunt passed at 63 due to fall and brain bleed. Brother has LDS. Discussed history of patient herself. She is from [facility] and has most of her care up there, primarily pediatric neurology and family practice. She has been carrying longstanding history of POTS as well as migraines for which she is followed closely with neurology in [place]. She has been getting intermittent brain/neck MRA for known cerebral tortuosity (details unclear - available imaging through care everywhere reveals only normal CT neck angiogram in  and MRA without contrast? In ). Imaging she's had of her chest demonstrated normal caliber aortic root and ascending aorta. Echocardiogram has report of normal aortic valve and mitral valve from . For POTS, she has been on Toprol 25 XL daily. She did try 12.5 mg bid short acting metoprolol but had severe symptoms in between doses. She feels close to passing out as soon as HR goes high and she gets extremely winded from tachycardia. HR goes up to 140s-180s at that time. She was  told in the past she may need pacemaker. She has never taken midodrine. Last tilt table test was in 2008 that showed HR in the 120s at 70 degree tilt within 5 minutes and subsequent drop in blood pressure. Patient is an athlete currently - she plays competitive volleyball and also walks, uses eliptical machine, and does light weight lifting. She has no h/o pregnancy but wishes to have children in the future.   On ROS she describes the following: high arch palate (no bifid uvula or cleft palate), easy bruising, arthritis, scoiliosis, hernia repair at 2, joint laxity, heavy periods, migraines and neck pain. No lens dislocation, uvula deformity, chest wall deformity. She had scoliosis surgery when she was younger with no complications per her recollection. Also reports poor circulation of her feet and has been noted to have absent pulses. Also describes upper extremity poor circulation and reynauds. Also has varicose veins.    Today's Visit    At last visit in 5/2018, She had ongoing issues of venous insufficiency, and was to some degree symptomatic from her POTS. She had an MRA leg run off study revealing abdominal aorta w/o stenosis or aneurysm, no stenosis or aneurysm in BLE. Venous findings were suboptimal, but bilateral iliac veins and bilateral common femoral veins appeared to be patient. Regarding her POTS, she was deemed stable ton Toprol-XL 25 mg PO every day.Today, patient reports she is still having tachycardia from her POTS. Having symptoms with bending down as she and her  recently moved and she has been trying to unpack many boxes.    PAST MEDICAL HISTORY  Past Medical History:   Diagnosis Date     Anxiety      Depression      Loeys-Dara syndrome      Migraine      POTS (postural orthostatic tachycardia syndrome)      Scoliosis     s/p lumbar spine fusion        CURRENT MEDICATIONS  Current Outpatient Prescriptions   Medication Sig Dispense Refill     cyclobenzaprine (FLEXERIL) 10 MG tablet Take  10 mg by mouth as needed.       Diclofenac Potassium (CATAFLAM) 50 MG TABS Take  by mouth as needed.       FLUoxetine HCl (PROZAC PO) Take 20 mg by mouth daily       GABAPENTIN PO Take 300 mg by mouth daily        levonorgestrel (PRATEEK) 13.5 MG IUD 1 each by Intrauterine route once       metoprolol (TOPROL-XL) 25 MG 24 hr tablet Take 25 mg by mouth daily.       midodrine (PROAMATINE) 5 MG tablet Take 1 tablet (5 mg) by mouth 2 times daily 180 tablet 3     Multiple Vitamin (DAILY MULTIVITAMIN PO) Take  by mouth.       Zolpidem Tartrate (AMBIEN PO) Take 1.75 mg by mouth At Bedtime        COMPRESSION STOCKINGS 1 each daily (Patient not taking: Reported on 11/13/2018) 1 each 1     Desogestrel-Ethinyl Estradiol (APRI PO) Take  by mouth daily.       Omeprazole 20 MG tablet Take 20 mg by mouth daily.         PAST SURGICAL HISTORY:  Past Surgical History:   Procedure Laterality Date     C LUMBAR SPINE FUSION,ANTER APPRCH  2000     HERNIORRHAPHY INGUINAL CHILD BILATERAL Bilateral        ALLERGIES     Allergies   Allergen Reactions     Amoxicillin Hives     Cephalexin Hcl Hives     Penicillins Hives     Adhesive Tape Rash     Compazine [Prochlorperazine] Anxiety and Other (See Comments)     akathisia       FAMILY HISTORY  No family history on file.      SOCIAL HISTORY  Social History     Social History     Marital status:      Spouse name: N/A     Number of children: N/A     Years of education: N/A     Occupational History     Not on file.     Social History Main Topics     Smoking status: Never Smoker     Smokeless tobacco: Never Used     Alcohol use Yes     Drug use: No     Sexual activity: Yes     Birth control/ protection: IUD     Other Topics Concern     Not on file     Social History Narrative       ROS:   Constitutional: No fever, chills, or sweats. No weight gain/loss   ENT: No visual disturbance, ear ache, epistaxis, sore throat  Allergies/Immunologic: Negative  Respiratory: No cough,  "hemoptysia  Cardiovascular: As per HPI  GI: No nausea, vomiting, hematemesis, melena, or hematochezia  : No urinary frequency, dysuria, or hematuria  Integument: Negative  Psychiatric: Negative  Neuro: Negative  Endocrinology: Negative   Musculoskeletal: Negative  Vascular: No walking impairment, claudication, ischemic rest pain or nonhealing wounds    EXAM:  BP 99/47 (BP Location: Right arm, Patient Position: Chair, Cuff Size: Adult Regular)  Pulse 92  Ht 1.778 m (5' 10\")  Wt 66.1 kg (145 lb 12.8 oz)  SpO2 98%  BMI 20.92 kg/m2  In general, the patient is a pleasant female in no apparent distress.    HEENT: NC/AT.  EOMI.  Sclerae white, not injected.  Nares clear.  Pharynx without erythema or exudate.  Dentition intact.    Neck: No adenopathy.  No thyromegaly. Carotids +2/2 bilaterally without bruits.  No jugular venous distension.   Heart: RRR. Normal S1, S2 splits physiologically. No murmur, rub, click, or gallop. The PMI is in the 5th ICS in the midclavicular line. There is no heave.    Lungs: CTA.  No ronchi, wheezes, rales.  No dullness to percussion.   Abdomen: Soft, nontender, nondistended. No organomegaly. No AAA.  No bruits.   Extremities: No clubbing, cyanosis, or edema.  No wounds. No varicose veins signs of chronic venous insufficiency.   Vascular: No bruits are noted.   Brachial Radial Ulnar Femoral Popliteal DP PT   Left - 2/2 - - 2/2 2/2 2/2   Right - 2/2 - - 2/2 2/2 2/2     Labs:  LIPID RESULTS:  No results found for: CHOL, HDL, LDL, TRIG, CHOLHDLRATIO, NHDL    LIVER ENZYME RESULTS:  No results found for: AST, ALT    CBC RESULTS:  Lab Results   Component Value Date    HGB 12.3 04/01/2014       BMP RESULTS:  No results found for: NA, POTASSIUM, CHLORIDE, CO2, ANIONGAP, GLC, BUN, CR, GFRESTIMATED, GFRESTBLACK, EILEEN     A1C RESULTS:  No results found for: A1C    Procedures:      CT ANGIO CAROTID    CLINICAL: Vertebral artery dissection.    TECHNICAL: Omnipaque-300 80 mL IV.    FINDINGS: Degraded " examination secondary to motion. Both vertebral  arteries are patent. They are visualized to the level the basilar  artery and intracranial circulation. Both common carotid arteries are  patent. ECAs and ICAs are patent bilaterally. Left subclavian artery  and right brachiocephalic and subclavian arteries appear patent.  Patent anterior and posterior circulation within the brain to the  level of the [place].    IMPRESSION: Negative examination.     Tilt Table (2009)   Blood pressure at rest was 109/74, heart rate 74 beats a minute. After tilting    up the table to 70 degrees, blood pressure first went up to 102/74 and heart    rate 107 beats a minute and then later the heart rate was 91/71 and pulse rate    was 126 beats a minute at 5 minutes.      The test was terminated then. The patient had no shortness of breath but had    chest pain. After bringing the table back to 0 degrees the heart rate    continues to be in the 50s. Blood pressure did not change.      Chest CTPE 2009    Signs and Symptoms for Radiological Exam from IDX:       * CHEST PAIN/SOB    CT CHEST FOR PULMONARY EMBOLI    TECHNICAL DATA: Omnipaque 300, 82 mL IV.    FINDINGS: Normal thoracic aorta. The pulmonary arterial tree is  normally opacified. No adenopathy. The lung fields are clear.    IMPRESSION: Normal evaluation.     Echocardiogram 2009  INTERPRETATION: An M-mode, 2-D, and Doppler echocardiogram was performed.      Left ventricular cavity size is normal. Wall thickness is normal. Regional and    global systolic function are normal. Left ventricular ejection fraction is    visually estimated to be 55%. Left atrium, right atrium, right ventricular    chambers are normal in size. The aortic root diameter is normal in size. The    aortic valve leaflets are normal in thickness and mobility with trace aortic    insufficiency. Mitral valve leaflets are normal in thickness and mobility with    trace mitral regurgitation. Tricuspid valve  leaflets are normal in thickness    and mobility. There is no pericardial effusion. Distal aortic arch and    proximal descending thoracic aorta are normal on 2-D and Doppler    echocardiography.        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 MSK involvement includes scoliosis, skin involvement +wound healing abnormalities, with no lens dislocation, or joint laxity. Cranio features include wideset eyes but no bifid uvula or cleft palate, although she has high arch palate. Echocardiogram demonstrates no significant valve disease although this was back in 2009. She is here for follow-up.    Impression is that all arterial imaging (MRI abd aorta and BLE, US BUE) has not revealed any evidence of stenosis or aneurysm.    Regarding her POTS, she has been on short-acting beta-blockade in the past and long-acting he is on now works the best. At this time, no syncope or significant bradycardia.    Plan:  -Per AHA/ACC guidelines, yearly surveillance of arterial vasculature from intracranial vessels to pelvis (MRA C/A/P without venogram every year)  -For her POTS, continue Toprol-XL 50 mg PO every day; no indication for pacemaker  -Should patient become pregnant, we are happy to co-manage along with MFM  -Continue LE compression stockings to aid in management of venous insufficnecy    Parth Parra MD  PGY4  Cardiovascular Fellow    ATTENDING ATTESTATION    Patient was seen and evaluated in clinic today with the cardiology fellow. The note has been edited to reflect the history taking performed by me, my personal review of imaging, EKGs, labs and procedures, and our joint assessment and plan.     Total time spent 60 minutes, of which >50% was spent in face-to-face patient evaluation, reviewing data with patient, prolonged counseling of lifestyle  modifications, any necessary genetic testing and screening of family members, and coordination of care.       Stephany Noble MD MSc    Division of Cardiology  Vascular Section  [facility]

## 2018-11-13 NOTE — NURSING NOTE
Chief Complaint   Patient presents with     Follow Up For     heart problem -- 27 yr old female with PMH significant for Dale'sFloyd with bradycardia presenting for evaluation     Vitals were taken and medications were reconciled.    Elaina Lares CMA     11:59 AM

## 2018-11-13 NOTE — PROGRESS NOTES
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       Prior History:     Mother: h/o mitral and aortic valve replacement at early age (diagnosed with valve disease in high school).  at 54 yrs old from ?thrombosed mitral valve replacement and subsequent congestive heart failure. Father is healthy and alive. Maternal aunt passed at 62 years old unknown etiology. Maternal uncle passed at 59 of unknown etiology. Other aunt passed at 63 due to fall and brain bleed. Brother has LDS. Discussed history of patient herself. She is from [facility] and has most of her care up there, primarily pediatric neurology and family practice. She has been carrying longstanding history of POTS as well as migraines for which she is followed closely with neurology in [place]. She has been getting intermittent brain/neck MRA for known cerebral tortuosity (details unclear - available imaging through care everywhere reveals only normal CT neck angiogram in  and MRA without contrast? In ). Imaging she's had of her chest demonstrated normal caliber aortic root and ascending aorta. Echocardiogram has report of normal aortic valve and mitral valve from . For POTS, she has been on Toprol 25 XL daily. She did try 12.5 mg bid short acting metoprolol but had severe symptoms in between doses. She feels close to passing out as soon as HR goes high and she gets extremely winded from tachycardia. HR goes up to 140s-180s at that time. She was told in the past she may need pacemaker. She has never taken midodrine. Last tilt table test was in  that showed HR in the 120s at 70 degree tilt within 5 minutes and subsequent drop in blood pressure. Patient is an athlete currently - she plays competitive volleyball and also walks, uses eliptical  machine, and does light weight lifting. She has no h/o pregnancy but wishes to have children in the future.   On ROS she describes the following: high arch palate (no bifid uvula or cleft palate), easy bruising, arthritis, scoiliosis, hernia repair at 2, joint laxity, heavy periods, migraines and neck pain. No lens dislocation, uvula deformity, chest wall deformity. She had scoliosis surgery when she was younger with no complications per her recollection. Also reports poor circulation of her feet and has been noted to have absent pulses. Also describes upper extremity poor circulation and reynauds. Also has varicose veins.    Today's Visit    At last visit in 5/2018, She had ongoing issues of venous insufficiency, and was to some degree symptomatic from her POTS. She had an MRA leg run off study revealing abdominal aorta w/o stenosis or aneurysm, no stenosis or aneurysm in BLE. Venous findings were suboptimal, but bilateral iliac veins and bilateral common femoral veins appeared to be patient. Regarding her POTS, she was deemed stable ton Toprol-XL 25 mg PO every day.Today, patient reports she is still having tachycardia from her POTS. Having symptoms with bending down as she and her  recently moved and she has been trying to unpack many boxes.    PAST MEDICAL HISTORY  Past Medical History:   Diagnosis Date     Anxiety      Depression      Loeys-Dara syndrome      Migraine      POTS (postural orthostatic tachycardia syndrome)      Scoliosis     s/p lumbar spine fusion        CURRENT MEDICATIONS  Current Outpatient Prescriptions   Medication Sig Dispense Refill     cyclobenzaprine (FLEXERIL) 10 MG tablet Take 10 mg by mouth as needed.       Diclofenac Potassium (CATAFLAM) 50 MG TABS Take  by mouth as needed.       FLUoxetine HCl (PROZAC PO) Take 20 mg by mouth daily       GABAPENTIN PO Take 300 mg by mouth daily        levonorgestrel (PRATEEK) 13.5 MG IUD 1 each by Intrauterine route once       metoprolol  (TOPROL-XL) 25 MG 24 hr tablet Take 25 mg by mouth daily.       midodrine (PROAMATINE) 5 MG tablet Take 1 tablet (5 mg) by mouth 2 times daily 180 tablet 3     Multiple Vitamin (DAILY MULTIVITAMIN PO) Take  by mouth.       Zolpidem Tartrate (AMBIEN PO) Take 1.75 mg by mouth At Bedtime        COMPRESSION STOCKINGS 1 each daily (Patient not taking: Reported on 11/13/2018) 1 each 1     Desogestrel-Ethinyl Estradiol (APRI PO) Take  by mouth daily.       Omeprazole 20 MG tablet Take 20 mg by mouth daily.         PAST SURGICAL HISTORY:  Past Surgical History:   Procedure Laterality Date     C LUMBAR SPINE FUSION,ANTER APPRCH  2000     HERNIORRHAPHY INGUINAL CHILD BILATERAL Bilateral        ALLERGIES     Allergies   Allergen Reactions     Amoxicillin Hives     Cephalexin Hcl Hives     Penicillins Hives     Adhesive Tape Rash     Compazine [Prochlorperazine] Anxiety and Other (See Comments)     akathisia       FAMILY HISTORY  No family history on file.      SOCIAL HISTORY  Social History     Social History     Marital status:      Spouse name: N/A     Number of children: N/A     Years of education: N/A     Occupational History     Not on file.     Social History Main Topics     Smoking status: Never Smoker     Smokeless tobacco: Never Used     Alcohol use Yes     Drug use: No     Sexual activity: Yes     Birth control/ protection: IUD     Other Topics Concern     Not on file     Social History Narrative       ROS:   Constitutional: No fever, chills, or sweats. No weight gain/loss   ENT: No visual disturbance, ear ache, epistaxis, sore throat  Allergies/Immunologic: Negative  Respiratory: No cough, hemoptysia  Cardiovascular: As per HPI  GI: No nausea, vomiting, hematemesis, melena, or hematochezia  : No urinary frequency, dysuria, or hematuria  Integument: Negative  Psychiatric: Negative  Neuro: Negative  Endocrinology: Negative   Musculoskeletal: Negative  Vascular: No walking impairment, claudication, ischemic  "rest pain or nonhealing wounds    EXAM:  BP 99/47 (BP Location: Right arm, Patient Position: Chair, Cuff Size: Adult Regular)  Pulse 92  Ht 1.778 m (5' 10\")  Wt 66.1 kg (145 lb 12.8 oz)  SpO2 98%  BMI 20.92 kg/m2  In general, the patient is a pleasant female in no apparent distress.    HEENT: NC/AT.  EOMI.  Sclerae white, not injected.  Nares clear.  Pharynx without erythema or exudate.  Dentition intact.    Neck: No adenopathy.  No thyromegaly. Carotids +2/2 bilaterally without bruits.  No jugular venous distension.   Heart: RRR. Normal S1, S2 splits physiologically. No murmur, rub, click, or gallop. The PMI is in the 5th ICS in the midclavicular line. There is no heave.    Lungs: CTA.  No ronchi, wheezes, rales.  No dullness to percussion.   Abdomen: Soft, nontender, nondistended. No organomegaly. No AAA.  No bruits.   Extremities: No clubbing, cyanosis, or edema.  No wounds. No varicose veins signs of chronic venous insufficiency.   Vascular: No bruits are noted.   Brachial Radial Ulnar Femoral Popliteal DP PT   Left - 2/2 - - 2/2 2/2 2/2   Right - 2/2 - - 2/2 2/2 2/2     Labs:  LIPID RESULTS:  No results found for: CHOL, HDL, LDL, TRIG, CHOLHDLRATIO, NHDL    LIVER ENZYME RESULTS:  No results found for: AST, ALT    CBC RESULTS:  Lab Results   Component Value Date    HGB 12.3 04/01/2014       BMP RESULTS:  No results found for: NA, POTASSIUM, CHLORIDE, CO2, ANIONGAP, GLC, BUN, CR, GFRESTIMATED, GFRESTBLACK, EILEEN     A1C RESULTS:  No results found for: A1C    Procedures:      CT ANGIO CAROTID    CLINICAL: Vertebral artery dissection.    TECHNICAL: Omnipaque-300 80 mL IV.    FINDINGS: Degraded examination secondary to motion. Both vertebral  arteries are patent. They are visualized to the level the basilar  artery and intracranial circulation. Both common carotid arteries are  patent. ECAs and ICAs are patent bilaterally. Left subclavian artery  and right brachiocephalic and subclavian arteries appear " patent.  Patent anterior and posterior circulation within the brain to the  level of the [place] of [place].    IMPRESSION: Negative examination.     Tilt Table (2009)   Blood pressure at rest was 109/74, heart rate 74 beats a minute. After tilting    up the table to 70 degrees, blood pressure first went up to 102/74 and heart    rate 107 beats a minute and then later the heart rate was 91/71 and pulse rate    was 126 beats a minute at 5 minutes.      The test was terminated then. The patient had no shortness of breath but had    chest pain. After bringing the table back to 0 degrees the heart rate    continues to be in the 50s. Blood pressure did not change.      Chest CTPE 2009    Signs and Symptoms for Radiological Exam from IDX:       * CHEST PAIN/SOB    CT CHEST FOR PULMONARY EMBOLI    TECHNICAL DATA: Omnipaque 300, 82 mL IV.    FINDINGS: Normal thoracic aorta. The pulmonary arterial tree is  normally opacified. No adenopathy. The lung fields are clear.    IMPRESSION: Normal evaluation.     Echocardiogram 2009  INTERPRETATION: An M-mode, 2-D, and Doppler echocardiogram was performed.      Left ventricular cavity size is normal. Wall thickness is normal. Regional and    global systolic function are normal. Left ventricular ejection fraction is    visually estimated to be 55%. Left atrium, right atrium, right ventricular    chambers are normal in size. The aortic root diameter is normal in size. The    aortic valve leaflets are normal in thickness and mobility with trace aortic    insufficiency. Mitral valve leaflets are normal in thickness and mobility with    trace mitral regurgitation. Tricuspid valve leaflets are normal in thickness    and mobility. There is no pericardial effusion. Distal aortic arch and    proximal descending thoracic aorta are normal on 2-D and Doppler    echocardiography.       Assessment and Plan:     27 yr old with mutation + Loeys Dara syndrome, reported normal caliber aortic root and  ascending aorta on echocardiogram in 2009 and CTPE, as well as POTS diagnosed in 2009 by Tilt testing. MSK involvement includes scoliosis, skin involvement +wound healing abnormalities, with no lens dislocation, or joint laxity. Cranio features include wideset eyes but no bifid uvula or cleft palate, although she has high arch palate. Echocardiogram demonstrates no significant valve disease although this was back in 2009. She is here for follow-up.    Impression is that all arterial imaging (MRI abd aorta and BLE, US BUE) has not revealed any evidence of stenosis or aneurysm.    Regarding her POTS, she has been on short-acting beta-blockade in the past and long-acting he is on now works the best. At this time, no syncope or significant bradycardia.    Plan:  -Per AHA/ACC guidelines, yearly surveillance of arterial vasculature from intracranial vessels to pelvis (MRA C/A/P without venogram every year)  -For her POTS, continue Toprol-XL 50 mg PO every day; no indication for pacemaker  -Should patient become pregnant, we are happy to co-manage along with MFM  -Continue LE compression stockings to aid in management of venous insufficnecy    Parth Parra MD  PGY4  Cardiovascular Fellow    ATTENDING ATTESTATION    Patient was seen and evaluated in clinic today with the cardiology fellow. The note has been edited to reflect the history taking performed by me, my personal review of imaging, EKGs, labs and procedures, and our joint assessment and plan.     Total time spent 60 minutes, of which >50% was spent in face-to-face patient evaluation, reviewing data with patient, prolonged counseling of lifestyle modifications, any necessary genetic testing and screening of family members, and coordination of care.       Stephany Noble MD MSc    Division of Cardiology  Vascular Section  [facility]

## 2018-11-13 NOTE — NURSING NOTE
Cardiac/Vascular Testing: Patient given instructions regarding MRA head/neck/brain.  Patient given orders.  She will have done in [place]. Discussed purpose, preparation, procedure and when to expect results reported back to the patient. Patient demonstrated understanding of this information and agreed to call with further questions or concerns.  Med Reconcile: Reviewed and verified all current medications with the patient. The updated medication list was printed and given to the patient.  Return Appointment: 6 months with Dr. Noble in Genetics clinic.  Patient given instructions regarding scheduling next clinic visit. Patient demonstrated understanding of this information and agreed to call with further questions or concerns.  Medication Change: Patient encouraged to wear compression hosiery daily.  Patient was educated regarding prescribed medication change, including discussion of the indication, administration, side effects, and when to report to MD or RN. Patient demonstrated understanding of this information and agreed to call with further questions or concerns.  Patient stated she understood all health information given and agreed to call with further questions or concerns.

## 2018-11-13 NOTE — MR AVS SNAPSHOT
After Visit Summary   11/13/2018    Day Merlos    MRN: [id number]           Patient Information     Date Of Birth          1990        Visit Information        Provider Department      11/13/2018 12:00 PM Stephany Noble MD [facility]        Today's Diagnoses     Tension headache    -  1    Loeys-Dara syndrome          Care Instructions    You were seen today in the Cardiovascular Clinic at the [facility].      Cardiology Providers you saw during your visit:   Dr. Noble    Diagnosis:   Loeys/Dara syndrome    Results:  Imaging reviewed.    Recommendations:   Head/neck/brain MRA.  We will give you the order to take to [place].     Compression stockings daily.     Battery operated heated gloves     Continue Midodrine 5 mg three times per day.     If you plan to get pregnant... Please schedule appointment with Dr. Noble before beginning to try.    Follow-up:  6 months with Dr. Noble      For emergencies call 911.    For any scheduling needs, please call [phone].     Thank you for your visit today!     Please call if you have any questions or concerns.  Liu Arias RN              Follow-ups after your visit        Additional Services     Follow-Up with Vascular Cardiologist                 Your next 10 appointments already scheduled     May 28, 2019 11:30 AM CDT   (Arrive by 11:15 AM)   Return Vascular Genetics with Stephany Noble MD   [facility] ([facility])    [address] [place] [id number] [phone]              Future tests that were ordered for you today     Open Future Orders        Priority Expected Expires Ordered    Follow-Up with Vascular Cardiologist Routine 5/12/2019 11/13/2019 11/13/2018    MRA Brain ([place]) wo Contrast Routine 11/13/2018 11/13/2019 11/13/2018    MRA Neck (Carotids) wo & w Contrast Routine 11/13/2018 11/13/2019 11/13/2018            Who to contact      "If you have questions or need follow up information about today's clinic visit or your schedule please contact [facility] directly at [phone].  Normal or non-critical lab and imaging results will be communicated to you by MyChart, letter or phone within 4 business days after the clinic has received the results. If you do not hear from us within 7 days, please contact the clinic through SyncSumhart or phone. If you have a critical or abnormal lab result, we will notify you by phone as soon as possible.  Submit refill requests through ChatStat or call your pharmacy and they will forward the refill request to us. Please allow 3 business days for your refill to be completed.          Additional Information About Your Visit        SyncSumharNanoleaf Information     ChatStat lets you send messages to your doctor, view your test results, renew your prescriptions, schedule appointments and more. To sign up, go to [email or web address] . Click on \"Log in\" on the left side of the screen, which will take you to the Welcome page. Then click on \"Sign up Now\" on the right side of the page.     You will be asked to enter the access code listed below, as well as some personal information. Please follow the directions to create your username and password.     Your access code is: [email or web address]  Expires: 2019  5:30 AM     Your access code will  in 90 days. If you need help or a new code, please call your [place] clinic or [phone].        Care EveryWhere ID     This is your Care EveryWhere ID. This could be used by other organizations to access your [place] medical records  [id number]        Your Vitals Were     Pulse Height Pulse Oximetry BMI (Body Mass Index)          92 1.778 m (5' 10\") 98% 20.92 kg/m2         Blood Pressure from Last 3 Encounters:   18 99/47   18 110/72   12 110/70    Weight from Last 3 Encounters:   18 66.1 kg (145 lb 12.8 oz)   18 67.2 kg (148 lb 1.6 oz)   12 " 61.3 kg (135 lb 2.3 oz)               Primary Care Provider Office Phone # Fax #    Jennifer LOW MD [place] [phone]       [facility] [address] [address]        Equal Access to Services     ROYERWILL ERASMO AH: Hadii aad ku hadasho Soomaali, waaxda luqadaha, qaybta kaalmada adeegyada, waxay idiin hayaan adeeg khdiann lafranciscon ah. So [facility] [phone].    ATENCIÓN: Si habla español, tiene a bland disposición servicios gratuitos de asistencia lingüística. Llame al [phone].    We comply with applicable federal civil rights laws and [place] laws. We do not discriminate on the basis of race, color, national origin, age, disability, sex, sexual orientation, or gender identity.            Thank you!     Thank you for choosing [facility]  for your care. Our goal is always to provide you with excellent care. Hearing back from our patients is one way we can continue to improve our services. Please take a few minutes to complete the written survey that you may receive in the mail after your visit with us. Thank you!             Your Updated Medication List - Protect others around you: Learn how to safely use, store and throw away your medicines at www.disposemymeds.org.          This list is accurate as of 11/13/18  1:22 PM.  Always use your most recent med list.                   Brand Name Dispense Instructions for use Diagnosis    AMBIEN PO      Take 1.75 mg by mouth At Bedtime        APRI PO      Take  by mouth daily.    Familial aortic aneurysm, Thin skin, Joint hyperextensibility of multiple sites       CATAFLAM 50 MG Tabs   Generic drug:  Diclofenac Potassium      Take  by mouth as needed.    Familial aortic aneurysm, Thin skin, Joint hyperextensibility of multiple sites       COMPRESSION STOCKINGS     1 each    1 each daily    Loeys-Dara syndrome type 4       DAILY MULTIVITAMIN PO      Take  by mouth.    Familial aortic aneurysm, Thin skin, Joint  hyperextensibility of multiple sites       FLEXERIL 10 MG tablet   Generic drug:  cyclobenzaprine      Take 10 mg by mouth as needed.    Familial aortic aneurysm, Thin skin, Joint hyperextensibility of multiple sites       GABAPENTIN PO      Take 300 mg by mouth daily    Familial aortic aneurysm, Thin skin, Joint hyperextensibility of multiple sites       metoprolol succinate 25 MG 24 hr tablet    TOPROL-XL     Take 25 mg by mouth daily.    Familial aortic aneurysm, Thin skin, Joint hyperextensibility of multiple sites       midodrine 5 MG tablet    PROAMATINE    180 tablet    Take 1 tablet (5 mg) by mouth 2 times daily    Familial aortic aneurysm, Thin skin, Joint hyperextensibility of multiple sites       omeprazole 20 MG tablet      Take 20 mg by mouth daily.    Familial aortic aneurysm, Thin skin, Joint hyperextensibility of multiple sites       PROZAC PO      Take 20 mg by mouth daily        PRATEEK 13.5 MG IUD   Generic drug:  levonorgestrel      1 each by Intrauterine route once

## 2018-11-13 NOTE — PATIENT INSTRUCTIONS
You were seen today in the Cardiovascular Clinic at the [facility].      Cardiology Providers you saw during your visit:   Dr. Noble    Diagnosis:   Loeys/Dara syndrome    Results:  Imaging reviewed.    Recommendations:   Head/neck/brain MRA.  We will give you the order to take to [place].     Compression stockings daily.     Battery operated heated gloves     Continue Midodrine 5 mg three times per day.     If you plan to get pregnant... Please schedule appointment with Dr. Noble before beginning to try.    Follow-up:  6 months with Dr. Noble      For emergencies call [phone].    For any scheduling needs, please call [phone].     Thank you for your visit today!     Please call if you have any questions or concerns.  Liu Arias RN

## 2019-04-01 DIAGNOSIS — M24.80 JOINT HYPEREXTENSIBILITY OF MULTIPLE SITES: ICD-10-CM

## 2019-04-01 DIAGNOSIS — R23.4 THIN SKIN: ICD-10-CM

## 2019-04-01 DIAGNOSIS — Z82.49 FAMILIAL AORTIC ANEURYSM: ICD-10-CM

## 2019-04-02 NOTE — TELEPHONE ENCOUNTER
metoprolol (TOPROL-XL) 25 MG 24 hr tablet     Last Written Prescription Date:  unknown  Last Fill Quantity: unknown,   # refills: unknown  Last Office Visit : 11/13/18  Future Office visit:  5/28/19      Routing refill request to provider for review/approval because: historical, dx? Instructions?

## 2019-04-03 ENCOUNTER — TELEPHONE (OUTPATIENT)
Dept: CARDIOLOGY | Facility: CLINIC | Age: 29
End: 2019-04-03

## 2019-04-03 RX ORDER — METOPROLOL SUCCINATE 25 MG/1
25 TABLET, EXTENDED RELEASE ORAL DAILY
Qty: 90 TABLET | Refills: 3 | Status: SHIPPED | OUTPATIENT
Start: 2019-04-03 | End: 2020-03-13

## 2019-04-03 NOTE — TELEPHONE ENCOUNTER
Sent in prescription for Metoprolol XL.  Called and left patient voicemail.     Reg Marrero, RN  RN Care Coordinator  [facility]  [phone]

## 2019-04-03 NOTE — TELEPHONE ENCOUNTER
Patient called noting that she is out of her Metoprolol XL 25mg. Mentioned that [facility] in [place], [place] is waiting for the authorization to come through.

## 2019-05-20 ENCOUNTER — DOCUMENTATION ONLY (OUTPATIENT)
Dept: CARE COORDINATION | Facility: CLINIC | Age: 29
End: 2019-05-20

## 2019-05-29 ENCOUNTER — TELEPHONE (OUTPATIENT)
Dept: CARDIOLOGY | Facility: CLINIC | Age: 29
End: 2019-05-29

## 2019-05-31 ENCOUNTER — TELEPHONE (OUTPATIENT)
Dept: CARDIOLOGY | Facility: CLINIC | Age: 29
End: 2019-05-31

## 2019-06-03 ENCOUNTER — TELEPHONE (OUTPATIENT)
Dept: CARDIOLOGY | Facility: CLINIC | Age: 29
End: 2019-06-03

## 2019-10-07 ENCOUNTER — TELEPHONE (OUTPATIENT)
Dept: CARDIOLOGY | Facility: CLINIC | Age: 29
End: 2019-10-07

## 2019-10-07 NOTE — TELEPHONE ENCOUNTER
"Called patient in response to her call. Asked her to describe what she is experiencing. She noted that at night, when she is trying to sleep, feels like \"someone is cutting off the circulation in my neck.\" It resolves as soon as she moves her next. She notices it happening on the right side when lying on back and head turns one way or the other. She first noticed this almost a year ago, around the time of her appointment with Dr. Noble, but it has increased in occurrence over the last several days and patient is worried.    Last chest and cardiac MRI done in 11/2018. Dr. Noble's note from 11/13/2018 notes that \"per AHA/ACC guidelines, patient should have yearly surveillance of arterial vasculature from intracranial vessels to pelvis (MRA C/A/P without venogram) every year.    Told patient that I would touch base with Dr. Noble, confirm which imaging she wanted, and that we could always get the imaging underway, if a consult with Dr. Noble is not immediately available. Patient in agreement with plan.    "

## 2019-10-07 NOTE — TELEPHONE ENCOUNTER
Patient calls in today wondering if Dr. Noble wants her to be seen or if she could make an appointment.    Pt states she is having issues with her neck, feels like if she turns her neck the wrong way, that her blood flow is getting cut off.    Pt was supposed to be seen in 05/19 but did not make an appointment. Dr. Noble is booked out until December.

## 2019-10-14 NOTE — TELEPHONE ENCOUNTER
Date: 10/14/2019    Time of Call: 12:57 PM     Diagnosis:  Loeys Dara Syndrome     [ TORB ] Ordering provider: Dr. Stephany Noble  Order: MRA head/neck asap, the C/A/P can wait (it won't look at the neck)      Order received by: Karli La RN      Follow-up/additional notes: Called patient after hearing back from Dr. Noble. Left VM asking for callback.

## 2019-10-17 NOTE — TELEPHONE ENCOUNTER
Called patient and left voicemail. Asked for a callback to follow up on Dr. Noble's recommendation to get an MRA head/neck ASAP to help ascertain the cause of her pain. Provided call back information.

## 2019-11-04 ENCOUNTER — TELEPHONE (OUTPATIENT)
Dept: CARDIOLOGY | Facility: CLINIC | Age: 29
End: 2019-11-04

## 2019-11-04 NOTE — TELEPHONE ENCOUNTER
This patient left a voicemail on Saturday, 11/2/19, stating that she had the MRA done in [place] and has sent the images to us. The patient is wondering if we have the images.

## 2019-11-06 DIAGNOSIS — M24.80 JOINT HYPEREXTENSIBILITY OF MULTIPLE SITES: ICD-10-CM

## 2019-11-06 DIAGNOSIS — R23.4 THIN SKIN: ICD-10-CM

## 2019-11-06 DIAGNOSIS — Z82.49 FAMILIAL AORTIC ANEURYSM: ICD-10-CM

## 2019-11-07 RX ORDER — MIDODRINE HYDROCHLORIDE 5 MG/1
5 TABLET ORAL 2 TIMES DAILY
Qty: 180 TABLET | Refills: 3 | Status: SHIPPED | OUTPATIENT
Start: 2019-11-07 | End: 2020-11-20

## 2019-11-07 NOTE — TELEPHONE ENCOUNTER
midodrine (PROAMATINE) 5 MG tablet     Last Written Prescription Date:  8-24-18  Last Fill Quantity: 180,   # refills: 3  Last Office Visit : 11-13-18  Future Office visit:  none    Routing refill request to provider for review/approval because:  Med not on protocol    Scheduling has been notified to contact the pt for appointment.

## 2020-03-09 DIAGNOSIS — M24.80 JOINT HYPEREXTENSIBILITY OF MULTIPLE SITES: ICD-10-CM

## 2020-03-09 DIAGNOSIS — Z82.49 FAMILIAL AORTIC ANEURYSM: ICD-10-CM

## 2020-03-09 DIAGNOSIS — R23.4 THIN SKIN: ICD-10-CM

## 2020-03-13 RX ORDER — METOPROLOL SUCCINATE 25 MG/1
25 TABLET, EXTENDED RELEASE ORAL DAILY
Qty: 90 TABLET | Refills: 0 | Status: SHIPPED | OUTPATIENT
Start: 2020-03-13 | End: 2020-06-12

## 2020-03-13 NOTE — TELEPHONE ENCOUNTER
metoprolol succinate ER (TOPROL-XL) 25 MG   Last Written Prescription Date:  4/3/19  Last Fill Quantity: 90,   # refills: 3  Last Office Visit :11/13/18  Future Office visit:  NONE     Routing refill request to provider for review/approval because:  90 DAY RF   OVER  DUE RTC  Follow-up:  6 months with Dr. Noble  MAY  2019   Scheduling has been notified to contact the pt for appointment.

## 2020-03-20 ENCOUNTER — CARE COORDINATION (OUTPATIENT)
Dept: CARDIOLOGY | Facility: CLINIC | Age: 30
End: 2020-03-20

## 2020-03-20 DIAGNOSIS — Z53.9 ERRONEOUS ENCOUNTER--DISREGARD: Primary | ICD-10-CM

## 2020-03-20 DIAGNOSIS — Q87.89 LOEYS-DIETZ SYNDROME TYPE 4: Primary | ICD-10-CM

## 2020-03-20 NOTE — PROGRESS NOTES
Date: 3/20/2020    Time of Call: 12:26 PM     Diagnosis:  Alecs Dara     [ TORB ] Ordering provider: Dr. Bardales  Order: Per AHA/ACC guidelines, yearly surveillance of arterial vasculature from intracranial vessels to pelvis (MRA C/A/P without venogram every year) and establish care with Dr. Bardales     Order received by: CAESAR Calvillo     Follow-up/additional notes: Will refill for six months given national pandemic. Will put on reschedule list, possibly for Dr. Bardales in [place], for summer 2020.

## 2020-03-26 ENCOUNTER — TELEPHONE (OUTPATIENT)
Dept: CARDIOLOGY | Facility: CLINIC | Age: 30
End: 2020-03-26

## 2020-03-26 NOTE — TELEPHONE ENCOUNTER
"Returned call to patient. 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    According to the CDC those with underlying cardiovascular and respiratory conditions are considered at higher risk for complications due to the coronavirus.  It appears to be those at a higher risk have a history of diabetes or heart failure.  Reviewed precautions and recommendations from the CDC. At this time we recommend continuing to prioritize thorough hand hygiene, avoid touching your face, avoid touching common public surfaces if possible, staying home when you are ill, and trying to stay away from others that are ill.  We also are encouraging \"social distancing\" and avoiding crowds and public places and when you are out, staying 6 feet away from others as much as possible. We discussed how this is limited due to her work.    Patient verbalized understanding and is in agreement to the plan.    "

## 2020-03-26 NOTE — TELEPHONE ENCOUNTER
Patient calling in today to speak to Dr. Noble's team about guidance for COVID-19.  Patient just got tested for it and is waiting for the results. She is wondering if there is anything she needs to know or needs to tell the doctors if she has to go in for it.    Call back: [phone]   Ok to leave .

## 2020-06-08 DIAGNOSIS — M24.80 JOINT HYPEREXTENSIBILITY OF MULTIPLE SITES: ICD-10-CM

## 2020-06-08 DIAGNOSIS — R23.4 THIN SKIN: ICD-10-CM

## 2020-06-08 DIAGNOSIS — Z82.49 FAMILIAL AORTIC ANEURYSM: ICD-10-CM

## 2020-06-10 NOTE — TELEPHONE ENCOUNTER
metoprolol succinate ER (TOPROL-XL) 25 MG  Last Written Prescription Date:  3/13/20  Last Fill Quantity: 90,   # refills: 0  Last Office Visit : 11/13/18  Future Office visit:  NONE    Routing refill request to provider for review/approval because:  11/13/18  RTC  6 MOS     CONTINUE/RF?

## 2020-06-12 RX ORDER — METOPROLOL SUCCINATE 25 MG/1
TABLET, EXTENDED RELEASE ORAL
Qty: 90 TABLET | Refills: 0 | Status: SHIPPED | OUTPATIENT
Start: 2020-06-12

## 2020-06-19 ENCOUNTER — TELEPHONE (OUTPATIENT)
Dept: CARDIOLOGY | Facility: CLINIC | Age: 30
End: 2020-06-19

## 2020-06-19 NOTE — LETTER
July 23, 2020      TO: Day Merlos  [address] [address]         Dear Day,    Our records indicate that it is time for you to schedule your return visit with the [facility] in the CARDIOVASCULAR CONGENITAL CLINIC at the [facility] ([facility]).      To make your appointment, please call: [phone], Monday - Friday, 8 A.M. - 4:30 P.M (Central Time Zone).    Please check with your insurance company about your benefits.  Some insurance plans provide different coverage levels for services at [facility] hospital-based clinics.     We look forward to hearing from you.    Sincerely,    Arianna Zavala  Clinic Coordinator  [facility]  Office: [phone]  Fax: [phone]

## 2020-08-07 DIAGNOSIS — R23.4 THIN SKIN: ICD-10-CM

## 2020-08-07 DIAGNOSIS — Z82.49 FAMILIAL AORTIC ANEURYSM: ICD-10-CM

## 2020-08-07 DIAGNOSIS — M24.80 JOINT HYPEREXTENSIBILITY OF MULTIPLE SITES: ICD-10-CM

## 2020-08-11 RX ORDER — MIDODRINE HYDROCHLORIDE 5 MG/1
TABLET ORAL
Qty: 180 TABLET | Refills: 3 | OUTPATIENT
Start: 2020-08-11

## 2020-11-16 DIAGNOSIS — Z82.49 FAMILIAL AORTIC ANEURYSM: ICD-10-CM

## 2020-11-16 DIAGNOSIS — M24.80 JOINT HYPEREXTENSIBILITY OF MULTIPLE SITES: ICD-10-CM

## 2020-11-16 DIAGNOSIS — R23.4 THIN SKIN: ICD-10-CM

## 2020-11-20 NOTE — TELEPHONE ENCOUNTER
MIDODRINE HCL 5 MG TABLET  Last Written Prescription Date:  11/7/2019  Last Fill Quantity: 180,   # refills: 3  Last Office Visit : 11/13/2018  Future Office visit:  None  Routing refill request to provider for review/approval because:  Drug not on the [facility], P or [facility] refill protocol or controlled substance      Amanda Meza RN  Central Triage Red Flags/Med Refills

## 2020-12-04 RX ORDER — MIDODRINE HYDROCHLORIDE 5 MG/1
5 TABLET ORAL 2 TIMES DAILY
Qty: 60 TABLET | Refills: 0 | Status: SHIPPED | OUTPATIENT
Start: 2020-12-04 | End: 2021-11-04

## 2021-04-01 ENCOUNTER — TELEPHONE (OUTPATIENT)
Dept: CARDIOLOGY | Facility: CLINIC | Age: 31
End: 2021-04-01

## 2021-04-01 NOTE — TELEPHONE ENCOUNTER
Fax sent to [facility] in [place] to deny refill of Midodrine. Patient last seen 11/13/2018.

## 2021-04-07 ENCOUNTER — TELEPHONE (OUTPATIENT)
Dept: CARDIOLOGY | Facility: CLINIC | Age: 31
End: 2021-04-07

## 2021-04-07 DIAGNOSIS — Q87.89 LOEYS-DIETZ SYNDROME TYPE 4: Primary | ICD-10-CM

## 2021-04-07 DIAGNOSIS — R11.0 NAUSEA: ICD-10-CM

## 2021-04-07 NOTE — TELEPHONE ENCOUNTER
Patient called in to schedule her follow up, pt is not allergic to the contrast dye but it made her feel sick last time. She is wondering if she can get zofran prescribed.

## 2021-04-08 RX ORDER — ONDANSETRON 4 MG/1
4 TABLET, FILM COATED ORAL EVERY 8 HOURS PRN
Qty: 2 TABLET | Refills: 0 | Status: SHIPPED | OUTPATIENT
Start: 2021-04-08

## 2021-04-22 ENCOUNTER — MEDICAL CORRESPONDENCE (OUTPATIENT)
Dept: HEALTH INFORMATION MANAGEMENT | Facility: CLINIC | Age: 31
End: 2021-04-22

## 2021-04-22 ENCOUNTER — TRANSFERRED RECORDS (OUTPATIENT)
Dept: HEALTH INFORMATION MANAGEMENT | Facility: CLINIC | Age: 31
End: 2021-04-22

## 2021-04-30 ENCOUNTER — MEDICAL CORRESPONDENCE (OUTPATIENT)
Dept: HEALTH INFORMATION MANAGEMENT | Facility: CLINIC | Age: 31
End: 2021-04-30

## 2021-04-30 ENCOUNTER — TELEPHONE (OUTPATIENT)
Dept: CARDIOLOGY | Facility: CLINIC | Age: 31
End: 2021-04-30

## 2021-04-30 NOTE — TELEPHONE ENCOUNTER
[facility] Call Center    Phone Message    May a detailed message be left on voicemail: no     Reason for Call: Other: Pt was just recently diagnosed with COVID.  She wants to know if there's anything she should be doing considering her heart conditions and disease.  Please give Pt a call back at your earliest convenience.     Action Taken: Message routed to:  [facility]  Cardiology    Travel Screening: Not Applicable

## 2021-05-05 NOTE — CONFIDENTIAL NOTE
Can you have her see me in clinic?     I would advise her to take aspirin 81 mg daily!     Dr. Noble       Called patient to go over recommendations. Patient didn't answer, left detailed VM of taking Asprin 81mg daily. Patient has follow up appointment scheduled with Dr. Bardales in June. Will route back to Dr. Noble.       Future Appointments   Date Time Provider Department Center   6/22/2021 10:00 AM [address]   6/22/2021 10:15 AM [address]   6/22/2021 11:00 AM [address]   6/22/2021 12:00 PM [facility]          CAESAR Morales May 6, 2021 9:25 AM

## 2021-05-07 DIAGNOSIS — G90.A POTS (POSTURAL ORTHOSTATIC TACHYCARDIA SYNDROME): ICD-10-CM

## 2021-05-07 DIAGNOSIS — Q87.89 LOEYS-DIETZ SYNDROME TYPE 4: Primary | ICD-10-CM

## 2021-06-16 ENCOUNTER — PRE VISIT (OUTPATIENT)
Dept: MATERNAL FETAL MEDICINE | Facility: CLINIC | Age: 31
End: 2021-06-16

## 2021-06-17 DIAGNOSIS — Q87.89 LOEYS-DIETZ SYNDROME TYPE 4: Primary | ICD-10-CM

## 2021-06-21 NOTE — PROGRESS NOTES
Maternal-Fetal Medicine Consultation    Day Merlos  : 1990  MRN: [id number]    REFERRAL:  Day Merlos is a 30 year old referred by Dr. Peraza from [facility] in [place] for MFM consultation.    HPI:  Day Merlos is a 30 year old  here for MFM consultation regarding her diagnosis of Loeys Dara syndrome and infertility. Patient had a full consultation with Dr. Solorzano on 2017 regarding her Loeys Dara syndrome. Today, patient would like to discuss next steps in regards to her infertility and whether we have any recommendations moving forward. Day and her partner, Bigg have been actively trying to conceive for 1 year without success. Patient has irregular periods with 17-31 day cycles. She also wanted to re-discuss whether a vaginal delivery would be an option for her as per her consult with Dr. Solorzano, we would recommend a  section at 34-36w. Dr. Bardales, her cardiologist, stated that she has cared for patients with LDS who have delivered vaginally. She has also had MRA of chest, pelvis, and abdomen today.    Prenatal Care:  Primary OB care this pregnancy has been with Dr. Peraza from [facility] in [place].    Obstetrics History:  OB History    Para Term  AB Living   0 0 0 0 0 0   SAB TAB Ectopic Multiple Live Births   0 0 0 0 0       Past Medical History:  Past Medical History:   Diagnosis Date     Anxiety      Depression      Loeys-Dara syndrome      Migraine      POTS (postural orthostatic tachycardia syndrome)      Scoliosis     s/p lumbar spine fusion        Past Surgical History:  Past Surgical History:   Procedure Laterality Date     C LUMBAR SPINE FUSION,ANTER APPRCH       HERNIORRHAPHY INGUINAL CHILD BILATERAL Bilateral        Current Medications:  Prior to Admission medications    Medication Sig Last Dose Taking? Auth Provider   COMPRESSION STOCKINGS 1 each daily  Patient not taking: Reported on 2018   Stephany Noble MD    cyclobenzaprine (FLEXERIL) 10 MG tablet Take 10 mg by mouth as needed.   Reported, Patient   Desogestrel-Ethinyl Estradiol (APRI PO) Take  by mouth daily.   Reported, Patient   Diclofenac Potassium (CATAFLAM) 50 MG TABS Take  by mouth as needed.   Reported, Patient   FLUoxetine HCl (PROZAC PO) Take 20 mg by mouth daily   Reported, Patient   GABAPENTIN PO Take 300 mg by mouth daily    Reported, Patient   levonorgestrel (PRATEEK) 13.5 MG IUD 1 each by Intrauterine route once   Reported, Patient   metoprolol succinate ER (TOPROL-XL) 25 MG 24 hr tablet TAKE 1 TABLET BY MOUTH ONCE DAILY (CALL CLINIC TO SCHEDULE FOLLOW UP APPT PLEASE)   Stephany Noble MD   midodrine (PROAMATINE) 5 MG tablet Take 1 tablet (5 mg) by mouth 2 times daily Please call [phone] to set up follow up with Dr. Bardales in [place]   Rosa Bardales MD   Multiple Vitamin (DAILY MULTIVITAMIN PO) Take  by mouth.   Reported, Patient   Omeprazole 20 MG tablet Take 20 mg by mouth daily.   Reported, Patient   ondansetron (ZOFRAN) 4 MG tablet Take 1 tablet (4 mg) by mouth every 8 hours as needed for nausea (for CT dye nausea)   Rosa Bardales MD   Zolpidem Tartrate (AMBIEN PO) Take 1.75 mg by mouth At Bedtime    Reported, Patient       Allergies:  Amoxicillin, Cephalexin hcl, Penicillins, Adhesive tape, and Compazine [prochlorperazine]    Social History:   Occupation: Nurse    ROS:  10-point ROS negative except as in HPI     PHYSICAL EXAM:  Deferred     Other Imaging:   MRA abdomen, pelvis, chest (6/22/21):  1. The aortic root, ascending aorta, transverse arch and descending thoracic aorta are normal in size  without an aneurysm or dissection.     2. The abdominal aorta is normal in caliber at the level of the supra-renal, juxta-renal, and infra-renal  aorta. The common iliac and external iliac arteries are also normal in caliber bilaterally.     3.  The aortic arch is left sided. There is normal branching of the arch vessels. There is no  coarctation.     4. The main and proximal branch pulmonary arteries are normal in size.      5. The systemic venous connections are normal.      CONCLUSIONS: Normal caliber thoracic and abdominal aorta to the level of the external iliacs without  evidence of aneurysm or dissection. Compared to the prior examination dated 2018, there is no change  in the calibers of the thoracic aorta. The abdominal aorta calibers were not available for direct  comparison.    ASSESSMENT/PLAN:  Day Merlos is a 30 year old  at here for MFM consultation regarding:     Loeys Dara Syndrome and Recommendations Regarding Infertility  Recommend that patient see an [facility] specialist for further evaluation and management regarding primary infertility. From our standpoint, patient can pursue pregnancy via infertility treatment at this time. Once she is pregnant, recommend patient contact us for an appointment so we can continue to manage her care. At this time patient would likely not be interested in going as far as IVF, but would be interested in anything up until that. Briefly discussed the idea of a gestational carrier. Patient has thought about this, but at this time has decided that if she is unable to become pregnant, they will likely choose adoption.    Recommendations:  - Provided patient with contact information for multiple [facility] clinics in the [facility]. Patient can check in with her primary Ob/Gyn to see if she has any referrals for [facility] clinics in the [place] area.  - Patient can see any [facility] specialist as long as they feel comfortable managing her.    *Dr. Solorzano recommendations regarding Loeys Dara syndrome and pregnancy from 2017 copied below, any updates in bold - see encounter for full note*  1. Close follow up with Cardiology in pregnancy.   2. Recommend consultation with vascular surgery and hematology and cardiology at the [facility].  3. Co-management of pregnancy with MFM  4. Maternal  considerations   1. Baseline Echo prior to pregnancy, repeat echo in the 1st trimester and 3rd trimester   2. Baseline comprehensive angiography of the brain, thorax, abdomen, and pelvis prior to pregnancy. Repeat angiography as needed during pregnancy if concerns of aortic aneurysm  3. Continue beta blocker in pregnancy  4. Goal blood pressure in pregnancy <130/80  5. Fetal considerations   1. Pregestational genetic testing and/or gestational genetic testing per patient preference  2. Cervical length monitoring every two weeks between 16-23 weeks gestation   3. Level 2 ultrasound at 18-20 weeks gestation and monthly growth scans thereafter   4. Fetal echo at 22-24 weeks gestation   6. Delivery considerations  1. Anesthesia consult antepartum  2.  delivery between 34-36 weeks gestation following  betamethasone course for fetal lung maturity. Patient today wondering if there is an option for a vaginal delivery. Reviewed and gave Day a copy of a case series from 2019 in [facility] in which 75% of patients with Loeys Dara delivered via  and 25% delivered via vaginal delivery. 50% of patients delivered , generally due to intervention from Ob/Gyn due to risks of aortic dissection and aortic root dilation. Discussed with patients that vaginal delivery is not off the table, but would have to be a discussion with all parts of the care team and that all vascular and cardiac imaging would have to be completely normal. Re-iterated that the majority of patients with this condition deliver via  due to risks of aortic dilation and dissection, especially because aortic dilation is not necessarily predictive of aortic dissection. Additionally discussed that aortic root dilation is often permanent and does NOT reverse after pregnancy which leads to further incentive to delivery in the 34-36 time-frame. Discussed that because of these risks and the stress to the body during labor and pushing,  most providers would feel more comfortable delivering via  section.  3. Delivery at a tertiary care center with NICU and vascular surgeon available (alerted the patient that delivery in [place] is probably unlikely).  4. Caution with medications that may increase blood pressure  7. Post-partum  1. Close monitoring of wound healing  2. Close monitoring for signs or symptoms of aortic aneurysm or dissection     The patient was seen and evaluated with Dr. Carbajal.    Thank you for allowing us to participate in the care of your patient. Please do not hesitate to contact us if you have further questions regarding the management of your patient.     Maddi Deleon MD  Ob/Gyn Resident, PGY-2  2021 12:30 PM     MFM Attending Attestation    I have evaluated Day Merlos myself with Dr. Deleon. I spent a total of 60 minutes during today's encounter which included 15 minutes reviewing medical record, 30 minutes in direct patient contact, 15 minutes documenting in medical record. See note for details; I have made the necessary edits/additions.    Ella Carbajal MD  Maternal-Fetal Medicine

## 2021-06-22 ENCOUNTER — HOSPITAL ENCOUNTER (OUTPATIENT)
Dept: MRI IMAGING | Facility: CLINIC | Age: 31
End: 2021-06-22
Attending: INTERNAL MEDICINE
Payer: COMMERCIAL

## 2021-06-22 ENCOUNTER — OFFICE VISIT (OUTPATIENT)
Dept: MATERNAL FETAL MEDICINE | Facility: CLINIC | Age: 31
End: 2021-06-22
Attending: OBSTETRICS & GYNECOLOGY
Payer: COMMERCIAL

## 2021-06-22 ENCOUNTER — OFFICE VISIT (OUTPATIENT)
Dept: CARDIOLOGY | Facility: CLINIC | Age: 31
End: 2021-06-22
Attending: INTERNAL MEDICINE
Payer: COMMERCIAL

## 2021-06-22 VITALS
HEART RATE: 70 BPM | SYSTOLIC BLOOD PRESSURE: 106 MMHG | DIASTOLIC BLOOD PRESSURE: 72 MMHG | HEIGHT: 70 IN | OXYGEN SATURATION: 97 % | WEIGHT: 154 LBS | BODY MASS INDEX: 22.05 KG/M2

## 2021-06-22 DIAGNOSIS — Z31.69 ENCOUNTER FOR PRECONCEPTION CONSULTATION: ICD-10-CM

## 2021-06-22 DIAGNOSIS — G90.A POTS (POSTURAL ORTHOSTATIC TACHYCARDIA SYNDROME): ICD-10-CM

## 2021-06-22 DIAGNOSIS — Q87.89 LOEYS-DIETZ SYNDROME TYPE 4: ICD-10-CM

## 2021-06-22 DIAGNOSIS — Q87.89 LOEYS-DIETZ SYNDROME TYPE 4: Primary | ICD-10-CM

## 2021-06-22 LAB
ALBUMIN SERPL-MCNC: 3.7 G/DL (ref 3.4–5)
ALP SERPL-CCNC: 64 U/L (ref 40–150)
ALT SERPL W P-5'-P-CCNC: 15 U/L (ref 0–50)
ANION GAP SERPL CALCULATED.3IONS-SCNC: 6 MMOL/L (ref 3–14)
AST SERPL W P-5'-P-CCNC: 12 U/L (ref 0–45)
BASOPHILS # BLD AUTO: 0 10E9/L (ref 0–0.2)
BASOPHILS NFR BLD AUTO: 0.4 %
BILIRUB SERPL-MCNC: 0.4 MG/DL (ref 0.2–1.3)
BUN SERPL-MCNC: 7 MG/DL (ref 7–30)
CALCIUM SERPL-MCNC: 8.7 MG/DL (ref 8.5–10.1)
CHLORIDE SERPL-SCNC: 105 MMOL/L (ref 94–109)
CHOLEST SERPL-MCNC: 197 MG/DL
CO2 SERPL-SCNC: 27 MMOL/L (ref 20–32)
CREAT SERPL-MCNC: 0.82 MG/DL (ref 0.52–1.04)
DIFFERENTIAL METHOD BLD: NORMAL
EOSINOPHIL # BLD AUTO: 0.2 10E9/L (ref 0–0.7)
EOSINOPHIL NFR BLD AUTO: 2.9 %
ERYTHROCYTE [DISTWIDTH] IN BLOOD BY AUTOMATED COUNT: 12.7 % (ref 10–15)
GFR SERPL CREATININE-BSD FRML MDRD: >90 ML/MIN/{1.73_M2}
GLUCOSE SERPL-MCNC: 92 MG/DL (ref 70–99)
HCT VFR BLD AUTO: 42.3 % (ref 35–47)
HDLC SERPL-MCNC: 71 MG/DL
HGB BLD-MCNC: 13.6 G/DL (ref 11.7–15.7)
IMM GRANULOCYTES # BLD: 0 10E9/L (ref 0–0.4)
IMM GRANULOCYTES NFR BLD: 0.4 %
LDLC SERPL CALC-MCNC: 115 MG/DL
LYMPHOCYTES # BLD AUTO: 2.1 10E9/L (ref 0.8–5.3)
LYMPHOCYTES NFR BLD AUTO: 26.7 %
MCH RBC QN AUTO: 30.6 PG (ref 26.5–33)
MCHC RBC AUTO-ENTMCNC: 32.2 G/DL (ref 31.5–36.5)
MCV RBC AUTO: 95 FL (ref 78–100)
MONOCYTES # BLD AUTO: 0.7 10E9/L (ref 0–1.3)
MONOCYTES NFR BLD AUTO: 8.5 %
NEUTROPHILS # BLD AUTO: 4.8 10E9/L (ref 1.6–8.3)
NEUTROPHILS NFR BLD AUTO: 61.1 %
NONHDLC SERPL-MCNC: 126 MG/DL
NRBC # BLD AUTO: 0 10*3/UL
NRBC BLD AUTO-RTO: 0 /100
PLATELET # BLD AUTO: 352 10E9/L (ref 150–450)
POTASSIUM SERPL-SCNC: 3.7 MMOL/L (ref 3.4–5.3)
PROT SERPL-MCNC: 6.7 G/DL (ref 6.8–8.8)
RBC # BLD AUTO: 4.45 10E12/L (ref 3.8–5.2)
SODIUM SERPL-SCNC: 138 MMOL/L (ref 133–144)
TRIGL SERPL-MCNC: 53 MG/DL
TSH SERPL DL<=0.005 MIU/L-ACNC: 2.32 MU/L (ref 0.4–4)
WBC # BLD AUTO: 7.8 10E9/L (ref 4–11)

## 2021-06-22 PROCEDURE — 72198 MR ANGIO PELVIS W/O & W/DYE: CPT | Mod: 26 | Performed by: STUDENT IN AN ORGANIZED HEALTH CARE EDUCATION/TRAINING PROGRAM

## 2021-06-22 PROCEDURE — 85025 COMPLETE CBC W/AUTO DIFF WBC: CPT | Performed by: INTERNAL MEDICINE

## 2021-06-22 PROCEDURE — A9585 GADOBUTROL INJECTION: HCPCS | Performed by: INTERNAL MEDICINE

## 2021-06-22 PROCEDURE — G0463 HOSPITAL OUTPT CLINIC VISIT: HCPCS | Mod: 25

## 2021-06-22 PROCEDURE — 80061 LIPID PANEL: CPT | Performed by: INTERNAL MEDICINE

## 2021-06-22 PROCEDURE — 36415 COLL VENOUS BLD VENIPUNCTURE: CPT | Performed by: INTERNAL MEDICINE

## 2021-06-22 PROCEDURE — 71555 MRI ANGIO CHEST W OR W/O DYE: CPT | Mod: 26 | Performed by: STUDENT IN AN ORGANIZED HEALTH CARE EDUCATION/TRAINING PROGRAM

## 2021-06-22 PROCEDURE — 84443 ASSAY THYROID STIM HORMONE: CPT | Performed by: INTERNAL MEDICINE

## 2021-06-22 PROCEDURE — 255N000002 HC RX 255 OP 636: Performed by: INTERNAL MEDICINE

## 2021-06-22 PROCEDURE — 99244 OFF/OP CNSLTJ NEW/EST MOD 40: CPT | Mod: GC | Performed by: OBSTETRICS & GYNECOLOGY

## 2021-06-22 PROCEDURE — 99205 OFFICE O/P NEW HI 60 MIN: CPT | Mod: 25 | Performed by: INTERNAL MEDICINE

## 2021-06-22 PROCEDURE — 999N000069 HC STATISTIC GENETIC COUNSELING, < 16 MIN: Performed by: GENETIC COUNSELOR, MS

## 2021-06-22 PROCEDURE — 74185 MRA ABD W OR W/O CNTRST: CPT | Mod: 26 | Performed by: STUDENT IN AN ORGANIZED HEALTH CARE EDUCATION/TRAINING PROGRAM

## 2021-06-22 PROCEDURE — 72198 MR ANGIO PELVIS W/O & W/DYE: CPT

## 2021-06-22 PROCEDURE — 80053 COMPREHEN METABOLIC PANEL: CPT | Performed by: INTERNAL MEDICINE

## 2021-06-22 PROCEDURE — 93005 ELECTROCARDIOGRAM TRACING: CPT

## 2021-06-22 PROCEDURE — 71555 MRI ANGIO CHEST W OR W/O DYE: CPT

## 2021-06-22 PROCEDURE — 74185 MRA ABD W OR W/O CNTRST: CPT

## 2021-06-22 RX ORDER — GADOBUTROL 604.72 MG/ML
10 INJECTION INTRAVENOUS ONCE
Status: COMPLETED | OUTPATIENT
Start: 2021-06-22 | End: 2021-06-22

## 2021-06-22 RX ADMIN — GADOBUTROL 10 ML: 604.72 INJECTION INTRAVENOUS at 10:20

## 2021-06-22 RX ADMIN — GADOBUTROL 10 ML: 604.72 INJECTION INTRAVENOUS at 10:19

## 2021-06-22 ASSESSMENT — MIFFLIN-ST. JEOR: SCORE: 1497.54

## 2021-06-22 ASSESSMENT — PAIN SCALES - GENERAL: PAINLEVEL: NO PAIN (0)

## 2021-06-22 NOTE — LETTER
2021      RE: Day Merlos  [address] [address]       Dear Colleague,    Thank you for the opportunity to participate in the care of your patient, Day Merlos, at the [facility] [place] [facility]. Please see a copy of my visit note below.    CARDIOLOGY CONSULTATION:  Ms. Merlos is a 30 year old woman with a history of gene positivity for Loeys Dara Syndrome. I am meeting her for the first time.  She also has a history of POTS syndrome that is managed with compression stockings, beta blockers and fluids.     Ms. Solorzano has no history of aneurysmal disease. Her last MRA from head to pelvis was from 2018 and imaging from today is currently pending.  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 She is currently trying to get pregnant (has been trying for the past year).  She is seeing genetics and MFM today.     Family history is remarkable for mitral and aortic valve replacement in her mom at a young age (she  4 years ago at age 54 from thrombosed valve). She has one brother her also has Loeys Dara and has been fine.  Father is healthy. Maternal aunt passed at 62 years old unknown etiology. Maternal uncle passed at 59 of unknown etiology. Other aunt passed at 63 due to fall and brain bleed.      She played volleyball in the past and is working on getting back into that after an injury her ankle and hip; her  is a physical therapist and is helping her with rehab.    She has migraine headaches that are mostly managed with Tylenol and gabapentin.  She also has some venous insufficiency for which she uses compression stockings.      PAST MEDICAL HISTORY:  Past Medical History:   Diagnosis Date     Anxiety      Depression      Loeys-Dara syndrome      Migraine      POTS (postural orthostatic tachycardia syndrome)      Scoliosis     s/p lumbar spine fusion      CURRENT MEDICATIONS:  Current Outpatient  Medications   Medication Sig Dispense Refill     COMPRESSION STOCKINGS 1 each daily 1 each 1     cyclobenzaprine (FLEXERIL) 10 MG tablet Take 10 mg by mouth as needed.       Diclofenac Potassium (CATAFLAM) 50 MG TABS Take  by mouth as needed.       FLUoxetine HCl (PROZAC PO) Take 20 mg by mouth daily       GABAPENTIN PO Take 300 mg by mouth daily        metoprolol succinate ER (TOPROL-XL) 25 MG 24 hr tablet TAKE 1 TABLET BY MOUTH ONCE DAILY (CALL CLINIC TO SCHEDULE FOLLOW UP APPT PLEASE) 90 tablet 0     midodrine (PROAMATINE) 5 MG tablet Take 1 tablet (5 mg) by mouth 2 times daily Please call [phone] to set up follow up with Dr. Bardales in [place] 60 tablet 0     Multiple Vitamin (DAILY MULTIVITAMIN PO) Take  by mouth.       ondansetron (ZOFRAN) 4 MG tablet Take 1 tablet (4 mg) by mouth every 8 hours as needed for nausea (for CT dye nausea) 2 tablet 0     Zolpidem Tartrate (AMBIEN PO) Take 1.75 mg by mouth At Bedtime        Desogestrel-Ethinyl Estradiol (APRI PO) Take  by mouth daily.       levonorgestrel (PRATEEK) 13.5 MG IUD 1 each by Intrauterine route once       Omeprazole 20 MG tablet Take 20 mg by mouth daily.         PAST SURGICAL HISTORY:  Past Surgical History:   Pro[facility] Laterality Date     [facility]     HERNIORRHAPHY INGUINAL CHILD BILATERAL Bilateral        ALLERGIES  Promethazine, Cephalexin hcl, Adhesive tape, and Compazine [prochlorperazine]    FAMILY HX:  No family history on file.    SOCIAL HX:  Social History     Socioeconomic History     Marital status:      Spouse name: None     Number of children: None     Years of education: None     Highest education level: None   Occupational History     None   Social Needs     Financial resource strain: None     Food insecurity     Worry: None     Inability: None     Transportation needs     Medical: None     Non-medical: None   Tobacco Use     Smoking status: Never Smoker     Smokeless tobacco: Never Used   Substance  "and Sexual Activity     Alcohol use: Yes     Drug use: No     Sexual activity: Yes     Birth control/protection: I.U.D.   Lifestyle     Physical activity     Days per week: None     Minutes per session: None     Stress: None   Relationships     Social connections     Talks on phone: None     Gets together: None     Attends Caodaism service: None     Active member of club or organization: None     Attends meetings of clubs or organizations: None     Relationship status: None     Intimate partner violence     Fear of current or ex partner: None     Emotionally abused: None     Physically abused: None     Forced sexual activity: None   Other Topics Concern     Parent/sibling w/ CABG, MI or angioplasty before 65F 55M? Not Asked   Social History Narrative     None       ROS:  Constitutional: No fever, chills, or sweats. No weight gain/loss.   ENT: No visual disturbance, ear ache, epistaxis, sore throat.   Allergies/Immunologic: Negative.   Respiratory: No cough, hemoptysis.   Cardiovascular: As per HPI.   GI: No nausea, vomiting, hematemesis, melena, or hematochezia.   : No urinary frequency, dysuria, or hematuria.   Integument: Negative.   Psychiatric: Negative.   Neuro: Negative.   Endocrinology: Negative.   Musculoskeletal: No myalgia.    VITAL SIGNS:  /72 (BP Location: Left arm, Patient Position: Chair, Cuff Size: Adult Regular)   Pulse 70   Ht 1.784 m (5' 10.24\")   Wt 69.9 kg (154 lb)   SpO2 97%   BMI 21.95 kg/m    Body mass index is 21.95 kg/m .  Wt Readings from Last 2 Encounters:   06/22/21 69.9 kg (154 lb)   11/13/18 66.1 kg (145 lb 12.8 oz)       PHYSICAL EXAM  Day Merlos IS A 30 year old female.in no acute distress.  HEENT: Unremarkable.  Neck: JVP normal.  Carotids +4/4 bilaterally without bruits.  Lungs: CTA.  Cor: RRR. Normal S1 and S2.  No murmur, rub, or gallop. Abd: Soft, nontender, nondistended.  NABS. Extremities: No C/C/E.    Neuro: Grossly intact.    LABS    Lab Results   Component " Value Date    WBC 7.8 06/22/2021     Lab Results   Component Value Date    RBC 4.45 06/22/2021     Lab Results   Component Value Date    HGB 13.6 06/22/2021     Lab Results   Component Value Date    HCT 42.3 06/22/2021     No components found for: MCT  Lab Results   Component Value Date    MCV 95 06/22/2021     Lab Results   Component Value Date    MCH 30.6 06/22/2021     Lab Results   Component Value Date    MCHC 32.2 06/22/2021     Lab Results   Component Value Date    RDW 12.7 06/22/2021     Lab Results   Component Value Date     06/22/2021      Recent Labs   Lab Test 06/22/21  0932      POTASSIUM 3.7   CHLORIDE 105   CO2 27   ANIONGAP 6   GLC 92   BUN 7   CR 0.82   EILEEN 8.7     Recent Labs   Lab Test 06/22/21  0932   CHOL 197   HDL 71   *   TRIG 53   NHDL 126      #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 Merlos in [facility] today. We discussed today that her MRA results are pending, but assuming that there is no change, and there is no aneurysmal disease, that pregnancy is a possibility.  She is meeting with [facility] today; she has been seen in the past in 2017, but part of her visit may be secondary to the fact that she has been trying to get pregnant for the past year and has been unable.      We discussed that assuming that there is no change in her MRA, that vaginal delivery would be an option, which she was happy to hear. She understands she is higher risk for pregnancy and complications, including, but not limited to dissection and incompetent cervix, poor wound healing and bleeding.  She will need fetal echo.    Her blood pressure is under good control. She is on maximal dose that can be tolerated with her BP of beta blocker.  She knows about weight lifting and exercise restrictions.     Assuming MRA is unchanged, will plan to see her back  with echo in the first trimester or one year.     She understands and is in agreement with the plan. It was a pleasure to see her. Please do not hesitate to contact me with questions.     KENY Bardales MD   62 minutes face-face, documentation and review of records on day of visit

## 2021-06-22 NOTE — PROGRESS NOTES
CARDIOLOGY CONSULTATION:  Ms. Merlos is a 30 year old woman with a history of gene positivity for Loeys Dara Syndrome. I am meeting her for the first time.  She also has a history of POTS syndrome that is managed with compression stockings, beta blockers and fluids.     Ms. Solorzano has no history of aneurysmal disease. Her last MRA from head to pelvis was from 2018 and imaging from today is currently pending.  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. She is currently trying to get pregnant (has been trying for the past year).  She is seeing genetics and MFM today.     Family history is remarkable for mitral and aortic valve replacement in her mom at a young age (she  4 years ago at age 54 from thrombosed valve). She has one brother her also has Loeys Dara and has been fine.  Father is healthy. Maternal aunt passed at 62 years old unknown etiology. Maternal uncle passed at 59 of unknown etiology. Other aunt passed at 63 due to fall and brain bleed.      She played volleyball in the past and is working on getting back into that after an injury her ankle and hip; her  is a physical therapist and is helping her with rehab.    She has migraine headaches that are mostly managed with Tylenol and gabapentin.  She also has some venous insufficiency for which she uses compression stockings.      PAST MEDICAL HISTORY:  Past Medical History:   Diagnosis Date     Anxiety      Depression      Loeys-Dara syndrome      Migraine      POTS (postural orthostatic tachycardia syndrome)      Scoliosis     s/p lumbar spine fusion      CURRENT MEDICATIONS:  Current Outpatient Medications   Medication Sig Dispense Refill     COMPRESSION STOCKINGS 1 each daily 1 each 1     cyclobenzaprine (FLEXERIL) 10 MG tablet Take 10 mg by mouth as needed.       Diclofenac Potassium (CATAFLAM) 50 MG TABS Take  by mouth as needed.       FLUoxetine HCl (PROZAC PO) Take 20 mg by mouth daily       GABAPENTIN PO Take 300 mg by mouth  daily        metoprolol succinate ER (TOPROL-XL) 25 MG 24 hr tablet TAKE 1 TABLET BY MOUTH ONCE DAILY (CALL CLINIC TO SCHEDULE FOLLOW UP APPT PLEASE) 90 tablet 0     midodrine (PROAMATINE) 5 MG tablet Take 1 tablet (5 mg) by mouth 2 times daily Please call [phone] to set up follow up with Dr. Bardales in [place] 60 tablet 0     Multiple Vitamin (DAILY MULTIVITAMIN PO) Take  by mouth.       ondansetron (ZOFRAN) 4 MG tablet Take 1 tablet (4 mg) by mouth every 8 hours as needed for nausea (for CT dye nausea) 2 tablet 0     Zolpidem Tartrate (AMBIEN PO) Take 1.75 mg by mouth At Bedtime        Desogestrel-Ethinyl Estradiol (APRI PO) Take  by mouth daily.       levonorgestrel (PRATEEK) 13.5 MG IUD 1 each by Intrauterine route once       Omeprazole 20 MG tablet Take 20 mg by mouth daily.         PAST SURGICAL HISTORY:  Past Surgical History:   Procedure Laterality Date     C LUMBAR SPINE FUSION,ANTER APPRCH  2000     HERNIORRHAPHY INGUINAL CHILD BILATERAL Bilateral        ALLERGIES  Promethazine, Cephalexin hcl, Adhesive tape, and Compazine [prochlorperazine]    FAMILY HX:  No family history on file.    SOCIAL HX:  Social History     Socioeconomic History     Marital status:      Spouse name: None     Number of children: None     Years of education: None     Highest education level: None   Occupational History     None   Social Needs     Financial resource strain: None     Food insecurity     Worry: None     Inability: None     Transportation needs     Medical: None     Non-medical: None   Tobacco Use     Smoking status: Never Smoker     Smokeless tobacco: Never Used   Substance and Sexual Activity     Alcohol use: Yes     Drug use: No     Sexual activity: Yes     Birth control/protection: I.U.D.   Lifestyle     Physical activity     Days per week: None     Minutes per session: None     Stress: None   Relationships     Social connections     Talks on phone: None     Gets together: None     Attends Alevism service:  "None     Active member of club or organization: None     Attends meetings of clubs or organizations: None     Relationship status: None     Intimate partner violence     Fear of current or ex partner: None     Emotionally abused: None     Physically abused: None     Forced sexual activity: None   Other Topics Concern     Parent/sibling w/ CABG, MI or angioplasty before 65F 55M? Not Asked   Social History Narrative     None       ROS:  Constitutional: No fever, chills, or sweats. No weight gain/loss.   ENT: No visual disturbance, ear ache, epistaxis, sore throat.   Allergies/Immunologic: Negative.   Respiratory: No cough, hemoptysis.   Cardiovascular: As per HPI.   GI: No nausea, vomiting, hematemesis, melena, or hematochezia.   : No urinary frequency, dysuria, or hematuria.   Integument: Negative.   Psychiatric: Negative.   Neuro: Negative.   Endocrinology: Negative.   Musculoskeletal: No myalgia.    VITAL SIGNS:  /72 (BP Location: Left arm, Patient Position: Chair, Cuff Size: Adult Regular)   Pulse 70   Ht 1.784 m (5' 10.24\")   Wt 69.9 kg (154 lb)   SpO2 97%   BMI 21.95 kg/m    Body mass index is 21.95 kg/m .  Wt Readings from Last 2 Encounters:   06/22/21 69.9 kg (154 lb)   11/13/18 66.1 kg (145 lb 12.8 oz)       PHYSICAL EXAM  Day Merlos IS A 30 year old female.in no acute distress.  HEENT: Unremarkable.  Neck: JVP normal.  Carotids +4/4 bilaterally without bruits.  Lungs: CTA.  Cor: RRR. Normal S1 and S2.  No murmur, rub, or gallop. Abd: Soft, nontender, nondistended.  NABS. Extremities: No C/C/E.    Neuro: Grossly intact.    LABS    Lab Results   Component Value Date    WBC 7.8 06/22/2021     Lab Results   Component Value Date    RBC 4.45 06/22/2021     Lab Results   Component Value Date    HGB 13.6 06/22/2021     Lab Results   Component Value Date    HCT 42.3 06/22/2021     No components found for: MCT  Lab Results   Component Value Date    MCV 95 06/22/2021     Lab Results   Component Value " Date    MCH 30.6 06/22/2021     Lab Results   Component Value Date    MCHC 32.2 06/22/2021     Lab Results   Component Value Date    RDW 12.7 06/22/2021     Lab Results   Component Value Date     06/22/2021      Recent Labs   Lab Test 06/22/21  0932      POTASSIUM 3.7   CHLORIDE 105   CO2 27   ANIONGAP 6   GLC 92   BUN 7   CR 0.82   EILEEN 8.7     Recent Labs   Lab Test 06/22/21  0932   CHOL 197   HDL 71   *   TRIG 53   NHDL 126      #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 Merlos in Adult Congenital and Cardiovascular Genetics Clinic today. We discussed today that her MRA results are pending, but assuming that there is no change, and there is no aneurysmal disease, that pregnancy is a possibility.  She is meeting with [facility] today; she has been seen in the past in 2017, but part of her visit may be secondary to the fact that she has been trying to get pregnant for the past year and has been unable.      We discussed that assuming that there is no change in her MRA, that vaginal delivery would be an option, which she was happy to hear. She understands she is higher risk for pregnancy and complications, including, but not limited to dissection and incompetent cervix, poor wound healing and bleeding.  She will need fetal echo.    Her blood pressure is under good control. She is on maximal dose that can be tolerated with her BP of beta blocker.  She knows about weight lifting and exercise restrictions.     Assuming MRA is unchanged, will plan to see her back with echo in the first trimester or one year.     She understands and is in agreement with the plan. It was a pleasure to see her. Please do not hesitate to contact me with questions.     KENY Bardales MD   62 minutes face-face, documentation and review of records on day of visit

## 2021-06-22 NOTE — NURSING NOTE
Chief Complaint   Patient presents with     New Patient     New congenital heart     Vitals were taken, medications reconciled, and EKG performed.    Alonso Kim  12:07 PM

## 2021-06-22 NOTE — NURSING NOTE
Day here for preconception GC/MFM due to genetic condition. Dr. Deleon and Dr. Carbajal in to talk with pt. See consult note. Pt left amb and stable. Karli Petersen RN

## 2021-06-22 NOTE — PATIENT INSTRUCTIONS
You were seen today in the Adult Congenital and Cardiovascular Genetics Clinic at the [facility].    Cardiology Providers you saw during your visit:  KENY Bardales MD    Diagnosis:  Olivia Diamond, type 4    Results:  KENY Bardales MD reviewed the results of your labs, EKG, and MRA testing today in clinic.    Recommendations:    1. Continue to eat a heart healthy, low salt diet.  2. Continue to get 20-30 minutes of aerobic activity, 4-5 days per week.  Examples of aerobic activity include walking, running, swimming, cycling, etc.  3. Continue to observe good oral hygiene, with regular dental visits.  4. No changes today.    SBE prophylaxis:   Yes____  No__X__    Lifelong Bacterial Endocarditis Prophylaxis:  YES____  NO____    If YES is checked, follow the recommendations outlined below:  1. Take antibiotic(s) prior to recommended dental procedures and procedures on the respiratory tract or with infected skin, muscle or bones. SBE prophylaxis is not needed for routine GI and  procedures (ie. Colonoscopy or vaginal delivery)  2. Observe good oral hygiene daily, as advised by your dentist. Get regular professional dental care.  3. Keep cuts clean.  4. Infections should be treated promptly.  5. Symptoms of Infective Endocarditis could include: fever lasting more than 4-5 days or a recurrent fever that initially resolves but returns within 1-2 days)      Exercise restrictions:   Yes__X__  No____         If yes, list restrictions:  Must be allowed to rest if fatigued or SOB      Work restrictions:  Yes____  No_X___         If yes, list restrictions:    FASTING CHOLESTEROL was checked in the last 5 years YES_X__  NO___ (2021)  Continue to eat a heart healthy, low salt diet.         ____ Fasting lipid panel order today         ____ No changes in medications          ____ I recommend the following changes in your cholesterol medications.:          ____ Please follow up for cholesterol screening at your  primary care physician      Follow-up:  Follow up with Dr. Bardales in one year with head-to-pelvis MRA or at end of first trimester with an echo, whichever comes first.    If you have questions or concerns please contact us at:    Karli La, MSN, RN, CNL    Arianna Zavala (Scheduling)  Nurse Care Coordinator     Clinic   Adult Congenital and CV Genetics   Adult Congenital and CV Genetic  [facility]  (P) [phone]     (P) [phone]  [email or web address]   (F) [phone]        For after hours urgent needs, call [phone] and ask to speak to the Adult Congenital Physician on call.  Mention Job Code 0401.    For emergencies call 911.    [facility]   Clinics and [facility] Center  Mail Code [id number]  [address] [place]  [address]

## 2021-06-22 NOTE — PROGRESS NOTES
"[facility]  Genetic Counseling Consult    Patient:  Day Merlos YOB: 1990   Date of Service:  21      Day Merlos was seen at the [facility] for a preconception genetic consultation for the indication of her personal history of Loeys-Dara Syndrome and infertility. Day was accompanied to today's appointment by her partner, Bigg.       Impression/Plan:   Day had a genetic consultation today to discuss her recent infertility concerns as well as her personal history of Loeys Dara Syndrome. Day was seen for a preconception genetic counseling and [facility] consult on 17 where she met with CADE Hernandez and Dr. Alycia Solorzano due to her personal history of Loeys Dara Syndrome. Today, Day stated that she wants to meet with [facility] because she and her  have been trying to conceive for the past year and have not achieved pregnancy. She is wondering what \"next steps\" they should take in their effort to achieve pregnancy. Day is scheduled to meet with Dr. Ella Carbajal today for a [facility] consultation as well. See Dr. Carbajal' clinic note for further details.     Pregnancy History:   /Parity:       Day states that she and her , Bigg, have been trying to achieve pregnancy for the past year without success.     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 Whole exome sequencing through [facility] was done for Day and her mother to identify a molecular diagnosis. Day also has POTS syndrome, migraines, spinal stenosis, scoliosis s/p surgery, and depression/anxiety. Her Loeys-Dara symptoms include joint laxity and injury, bruising, scoliosis, and slow healing. She has a lumbar spinal stenosis and underwent lumbar fusion in . She " also has a history of bilateral inguinal hernia repair as a young child. She denies a personal history of aneurysm, arrhythmias, excessive bleeding, or joint dislocation. She has had normal echocardiograms, most recently in 2018. Today she had MRA abdome, MRA pelvis, MRA chest all with contrast.       Family History:   A three-generation pedigree was previously obtained by Leona [facility] in 2017, and is scanned under the  Media  tab.   The following significant updates were reported by Day:    Bigg's mother was recently diagnosed with breast cancer. He was unsure if she had positive genetic testing. However, his sister had negative BRCA1/2 genetic testing. I offered Bigg information for the [facility] Cancer Risk Management Program should he be interested in genetic counseling. We discussed that the results of this testing could affect his cancer screening recommendations. At this time, he declined written information on the clinic.     Otherwise, the reported family history is negative for multiple miscarriages, stillbirths, birth defects, cognitive impairment, known genetic conditions, and consanguinity.       Carrier Screening:   The patient reports that she and the father of the pregnancy have  ancestry:      Cystic fibrosis is an autosomal recessive genetic condition that occurs with increased frequency in individuals of  ancestry and carrier screening for this condition is available.  In addition,  screening in the [facility] includes cystic fibrosis.      Expanded carrier screening for mutations in a large panel of genes associated with autosomal recessive conditions including cystic fibrosis, spinal muscular atrophy, and others, is now available.      Carrier screening was not discussed today.       Risk Assessment:   We explained that the risk for fetal chromosome abnormalities increases with maternal age. We discussed specific features of common chromosome  "abnormalities, including Down syndrome, trisomy 13, trisomy 18, and sex chromosome trisomies.      - At age 30 at delivery, the risk to have a baby with Down syndrome is 1 in 909.     - At age 30 at delivery, the risk to have a baby with any chromosome abnormality is 1 in 355.       - At age 31 at delivery, the risk to have a baby with Down syndrome is 1 in 769.     - At age 31 at delivery, the risk to have a baby with any chromosome abnormality is 1 in 322.       - At age 32 at delivery, the risk to have a baby with Down syndrome is 1 in 625.     - At age 32 at delivery, the risk to have a baby with any chromosome abnormality is 1 in 312.       Loeys Dara Syndrome  We reviewed the information discussed at her 2017 consult with Leona [facility]. \"We discussed that Saundra's biological children would each have a 50% chance of inheriting the TGFB2 mutation that has been identified in her family.  This genetic change causes the connective tissue disorder Loeys-Dara syndrome, which is a condition that is characterized by enlargement of the aorta, aortic dissection, other arterial aneurysms/dissections, skeletal problems, contractures, degenerative disc disease, and other features of connective tissue disorders including abnormal wound healing, easy bruising, and hyper flexibility.  Symptoms of Loeys-Dara can vary in severity and presence between family members.  Prenatal complications can also include incompetent cervix, PPROM, and fetal birth defects such as cleft palate and club foot.  There is also significant maternal risk factors (see MFM consult).  IVF with PGD is an option as the familial mutation is known.\" We also discussed the option of prenatal invasive diagnostic testing ([facility] & amniocentesis) or  genetic testing.       Testing Options:   We discussed the following options:   Genetic Amniocentesis    Invasive procedure typically performed in the second trimester by which amniotic fluid is " obtained for the purpose of chromosome analysis and/or other prenatal genetic analysis    Diagnostic results; >99% sensitivity for fetal chromosome abnormalities    AFAFP measurement tests for open neural tube defects      We reviewed the benefits and limitations of this testing.  Screening tests provide a risk assessment specific to the pregnancy for certain fetal chromosome abnormalities, but cannot definitively diagnose or exclude a fetal chromosome abnormality.  Follow-up genetic counseling and consideration of diagnostic testing is recommended with any abnormal screening result.     Diagnostic tests carry inherent risks- including risk of miscarriage- that require careful consideration.  These tests can detect fetal chromosome abnormalities with greater than 99% certainty.  Results can be compromised by maternal cell contamination or mosaicism, and are limited by the resolution of cytogenetic G-banding technology.  There is no screening nor diagnostic test that can detect all forms of birth defects or mental disability.     It was a pleasure to be involved with Day [facility]wards [facility]. Face-to-face time of the meeting was 15 minutes.      Carol Gandhi MS, [facility]  Genetic Counselor  Maternal Fetal Medicine  [facility]   Phone: [phone]  Pager: [phone]  Email: [email or web address]

## 2021-06-22 NOTE — NURSING NOTE
Cardiac Testing: Patient given instructions regarding  echocardiogram . Discussed purpose, preparation, procedure and when to expect results reported back to the patient. Patient demonstrated understanding of this information and agreed to call with further questions or concerns.    Diet: Patient instructed regarding a heart healthy diet, including discussion of reduced fat and sodium intake. Patient demonstrated understanding of this information and agreed to call with further questions or concerns.    Labs: Patient was given results of the laboratory testing obtained today. Patient was instructed to return for the next laboratory testing in one year. Patient demonstrated understanding of this information and agreed to call with further questions or concerns.     Med Reconcile: Reviewed and verified all current medications with the patient. The updated medication list was printed and given to the patient.    Return Appointment: Patient given instructions regarding scheduling next clinic visit. Patient demonstrated understanding of this information and agreed to call with further questions or concerns.    Patient stated she understood all health information given and agreed to call with further questions or concerns.    Karli La, MSN, RN, CNL  Cardiology Care Coordinator  [facility]  [phone]

## 2021-06-23 LAB — INTERPRETATION ECG - MUSE: NORMAL

## 2021-07-17 ENCOUNTER — HEALTH MAINTENANCE LETTER (OUTPATIENT)
Age: 31
End: 2021-07-17

## 2021-09-11 ENCOUNTER — HEALTH MAINTENANCE LETTER (OUTPATIENT)
Age: 31
End: 2021-09-11

## 2021-11-04 ENCOUNTER — CARE COORDINATION (OUTPATIENT)
Dept: CARDIOLOGY | Facility: CLINIC | Age: 31
End: 2021-11-04

## 2021-11-04 DIAGNOSIS — Z82.49 FAMILIAL AORTIC ANEURYSM: ICD-10-CM

## 2021-11-04 DIAGNOSIS — M24.80 JOINT HYPEREXTENSIBILITY OF MULTIPLE SITES: ICD-10-CM

## 2021-11-04 DIAGNOSIS — R23.4 THIN SKIN: ICD-10-CM

## 2021-11-04 RX ORDER — MIDODRINE HYDROCHLORIDE 5 MG/1
5 TABLET ORAL 2 TIMES DAILY
Qty: 180 TABLET | Refills: 3 | Status: SHIPPED | OUTPATIENT
Start: 2021-11-04 | End: 2023-02-16

## 2022-08-07 ENCOUNTER — HEALTH MAINTENANCE LETTER (OUTPATIENT)
Age: 32
End: 2022-08-07

## 2022-09-21 ENCOUNTER — TELEPHONE (OUTPATIENT)
Dept: CARDIOLOGY | Facility: CLINIC | Age: 32
End: 2022-09-21

## 2022-09-21 DIAGNOSIS — Q87.89 LOEYS-DIETZ SYNDROME TYPE 4: ICD-10-CM

## 2022-09-21 DIAGNOSIS — G90.A POTS (POSTURAL ORTHOSTATIC TACHYCARDIA SYNDROME): Primary | ICD-10-CM

## 2022-09-21 DIAGNOSIS — Z82.49 FAMILIAL AORTIC ANEURYSM: ICD-10-CM

## 2022-09-22 NOTE — TELEPHONE ENCOUNTER
Orders have been updated for head to toe MRAs and labs. If instead she is pregnant Dr. Bardales wanted to see her back with an ECHO. Sent to Arianna for scheduling.  Wendi Castaneda RN on 9/22/2022 at 11:08 AM

## 2022-10-29 ENCOUNTER — HEALTH MAINTENANCE LETTER (OUTPATIENT)
Age: 32
End: 2022-10-29

## 2022-11-16 ENCOUNTER — MYC MEDICAL ADVICE (OUTPATIENT)
Dept: CARDIOLOGY | Facility: CLINIC | Age: 32
End: 2022-11-16

## 2022-11-16 ENCOUNTER — TRANSFERRED RECORDS (OUTPATIENT)
Dept: HEALTH INFORMATION MANAGEMENT | Facility: CLINIC | Age: 32
End: 2022-11-16

## 2022-11-17 ENCOUNTER — HOSPITAL ENCOUNTER (OUTPATIENT)
Dept: MRI IMAGING | Facility: CLINIC | Age: 32
Discharge: HOME | End: 2022-11-17
Attending: INTERNAL MEDICINE
Payer: COMMERCIAL

## 2022-11-17 DIAGNOSIS — Q87.89 LOEYS-DIETZ SYNDROME TYPE 4: ICD-10-CM

## 2022-11-17 DIAGNOSIS — Z82.49 FAMILIAL AORTIC ANEURYSM: ICD-10-CM

## 2022-11-17 DIAGNOSIS — G90.A POTS (POSTURAL ORTHOSTATIC TACHYCARDIA SYNDROME): ICD-10-CM

## 2022-11-17 PROCEDURE — 71555 MRI ANGIO CHEST W OR W/O DYE: CPT

## 2022-11-17 PROCEDURE — 74185 MRA ABD W OR W/O CNTRST: CPT

## 2022-11-17 PROCEDURE — 72198 MR ANGIO PELVIS W/O & W/DYE: CPT | Mod: 26 | Performed by: RADIOLOGY

## 2022-11-17 PROCEDURE — 255N000002 HC RX 255 OP 636: Performed by: INTERNAL MEDICINE

## 2022-11-17 PROCEDURE — A9585 GADOBUTROL INJECTION: HCPCS | Performed by: INTERNAL MEDICINE

## 2022-11-17 PROCEDURE — 72198 MR ANGIO PELVIS W/O & W/DYE: CPT

## 2022-11-17 PROCEDURE — 74185 MRA ABD W OR W/O CNTRST: CPT | Mod: 26 | Performed by: RADIOLOGY

## 2022-11-17 PROCEDURE — 71555 MRI ANGIO CHEST W OR W/O DYE: CPT | Mod: 26 | Performed by: RADIOLOGY

## 2022-11-17 RX ORDER — GADOBUTROL 604.72 MG/ML
10 INJECTION INTRAVENOUS ONCE
Status: COMPLETED | OUTPATIENT
Start: 2022-11-17 | End: 2022-11-17

## 2022-11-17 RX ADMIN — GADOBUTROL 10 ML: 604.72 INJECTION INTRAVENOUS at 10:38

## 2022-11-17 RX ADMIN — GADOBUTROL 10 ML: 604.72 INJECTION INTRAVENOUS at 10:39

## 2023-01-03 ENCOUNTER — OFFICE VISIT (OUTPATIENT)
Dept: CARDIOLOGY | Facility: CLINIC | Age: 33
End: 2023-01-03
Attending: INTERNAL MEDICINE
Payer: COMMERCIAL

## 2023-01-03 ENCOUNTER — ANCILLARY PROCEDURE (OUTPATIENT)
Dept: MRI IMAGING | Facility: CLINIC | Age: 33
End: 2023-01-03
Attending: INTERNAL MEDICINE
Payer: COMMERCIAL

## 2023-01-03 VITALS
BODY MASS INDEX: 23.98 KG/M2 | HEART RATE: 58 BPM | SYSTOLIC BLOOD PRESSURE: 110 MMHG | WEIGHT: 167.5 LBS | DIASTOLIC BLOOD PRESSURE: 66 MMHG | HEIGHT: 70 IN | OXYGEN SATURATION: 98 %

## 2023-01-03 DIAGNOSIS — Z82.49 FAMILIAL AORTIC ANEURYSM: ICD-10-CM

## 2023-01-03 DIAGNOSIS — G90.A POTS (POSTURAL ORTHOSTATIC TACHYCARDIA SYNDROME): Primary | ICD-10-CM

## 2023-01-03 DIAGNOSIS — Q87.89 LOEYS-DIETZ SYNDROME TYPE 4: ICD-10-CM

## 2023-01-03 DIAGNOSIS — G90.A POTS (POSTURAL ORTHOSTATIC TACHYCARDIA SYNDROME): ICD-10-CM

## 2023-01-03 DIAGNOSIS — M24.80 JOINT HYPEREXTENSIBILITY OF MULTIPLE SITES: ICD-10-CM

## 2023-01-03 PROCEDURE — 99215 OFFICE O/P EST HI 40 MIN: CPT | Performed by: INTERNAL MEDICINE

## 2023-01-03 PROCEDURE — A9585 GADOBUTROL INJECTION: HCPCS | Performed by: STUDENT IN AN ORGANIZED HEALTH CARE EDUCATION/TRAINING PROGRAM

## 2023-01-03 PROCEDURE — 70544 MR ANGIOGRAPHY HEAD W/O DYE: CPT | Performed by: STUDENT IN AN ORGANIZED HEALTH CARE EDUCATION/TRAINING PROGRAM

## 2023-01-03 PROCEDURE — 99417 PROLNG OP E/M EACH 15 MIN: CPT | Performed by: INTERNAL MEDICINE

## 2023-01-03 PROCEDURE — 99212 OFFICE O/P EST SF 10 MIN: CPT | Performed by: INTERNAL MEDICINE

## 2023-01-03 PROCEDURE — 93005 ELECTROCARDIOGRAM TRACING: CPT

## 2023-01-03 PROCEDURE — G0463 HOSPITAL OUTPT CLINIC VISIT: HCPCS | Mod: 25

## 2023-01-03 PROCEDURE — 70549 MR ANGIOGRAPH NECK W/O&W/DYE: CPT | Performed by: STUDENT IN AN ORGANIZED HEALTH CARE EDUCATION/TRAINING PROGRAM

## 2023-01-03 RX ORDER — GADOBUTROL 604.72 MG/ML
10 INJECTION INTRAVENOUS ONCE
Status: COMPLETED | OUTPATIENT
Start: 2023-01-03 | End: 2023-01-03

## 2023-01-03 RX ORDER — BACLOFEN 10 MG/1
10 TABLET ORAL 3 TIMES DAILY
COMMUNITY

## 2023-01-03 RX ORDER — CELECOXIB 100 MG/1
CAPSULE ORAL
COMMUNITY
Start: 2022-11-25

## 2023-01-03 RX ORDER — GADOBUTROL 604.72 MG/ML
7.5 INJECTION INTRAVENOUS ONCE
Status: DISCONTINUED | OUTPATIENT
Start: 2023-01-03 | End: 2023-01-03

## 2023-01-03 RX ADMIN — GADOBUTROL 10 ML: 604.72 INJECTION INTRAVENOUS at 14:23

## 2023-01-03 ASSESSMENT — PAIN SCALES - GENERAL: PAINLEVEL: NO PAIN (0)

## 2023-01-03 NOTE — LETTER
1/3/2023      RE: Day Merlos  [address] [facility] [address]       Dear Colleague,    Thank you for the opportunity to participate in the care of your patient, Day Merlos, at the [facility] HEART [facility]. Please see a copy of my visit note below.    CARDIOLOGY CONSULTATION:  Ms. Merlos is a 32 year old woman with a history of gene positivity for Loeys Dara Syndrome whom I met in . She also has a history of POTS syndrome that is managed with compression stockings, beta blockers and fluids.      Ms. Solorzano has no history of aneurysmal disease.   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. She had wanted to carry a pregnancy and had seen M but has been unsuccessful so now is trying not to stress about it and they are OK if they do not have children.      Family history is remarkable for mitral and aortic valve replacement in her mom at a young age (she  5 years ago at age 54 from thrombosed valve). She has one brother her also has Loeys Dara and has been fine - but has not been seen.  Father is healthy. Maternal aunt passed at 62 years old unknown etiology. Maternal uncle passed at 59 of unknown etiology. Other aunt passed at 63 due to fall and brain bleed.       She really enjoys volleyball for exercise and has been careful about physical contact with that.  She stopped playing basketball. Her  is a physical therapist and is helping her with rehab.     She has migraine headaches that are mostly managed with Tylenol and gabapentin.  She also has some venous insufficiency for which she uses compression stockings.     She had imagining of her head today that we will compare to that from 2016 when she saw Dr. Mark, but it is not back yet.   He stated that he did not see any obvious aneurysm or dissection at that time, focusing on the paraclinoid and supraclinoid internal carotid arteries as the few  reports in the literature have described aneurysms in these locations in association with Loeys-Dara.  There is an infundibulum at the left posterior communicating artery origin that is known.     In the Fall she was playing volleyball indoors and she drank 2 liters of free water and she felt really ill.  She ended up in the ED and her sodium was 126.  That has since corrected but it does seem that this was all related to overhydration of free water.  She broke her shoulder in pathologic location slipping on ice last winter and has not had an evaluation for osteoporosis or vitamin D level. Serum calcium, LFTs, TSH creatinine are all normal. Not a smoker or drinker and not a diabetic.      PAST MEDICAL HISTORY:  Past Medical History:   Diagnosis Date     Anxiety      Depression      Loeys-Dara syndrome      Migraine      POTS (postural orthostatic tachycardia syndrome)      Scoliosis     s/p lumbar spine fusion        CURRENT MEDICATIONS:  Current Outpatient Medications   Medication Sig Dispense Refill     baclofen (LIORESAL) 10 MG tablet Take 10 mg by mouth 3 times daily       celecoxib (CELEBREX) 100 MG capsule        COMPRESSION STOCKINGS 1 each daily 1 each 1     cyclobenzaprine (FLEXERIL) 10 MG tablet Take 10 mg by mouth as needed.       FLUoxetine HCl (PROZAC PO) Take 20 mg by mouth daily       GABAPENTIN PO Take 300 mg by mouth daily        metoprolol succinate ER (TOPROL-XL) 25 MG 24 hr tablet TAKE 1 TABLET BY MOUTH ONCE DAILY (CALL CLINIC TO SCHEDULE FOLLOW UP APPT PLEASE) 90 tablet 0     midodrine (PROAMATINE) 5 MG tablet Take 1 tablet (5 mg) by mouth 2 times daily Please call [phone] to set up follow up with Dr. Bardales in [place] 180 tablet 3     Multiple Vitamin (DAILY MULTIVITAMIN PO) Take  by mouth.       ondansetron (ZOFRAN) 4 MG tablet Take 1 tablet (4 mg) by mouth every 8 hours as needed for nausea (for CT dye nausea) 2 tablet 0     Zolpidem Tartrate (AMBIEN PO) Take 1.75 mg by mouth At Bedtime   "      diclofenac (CATAFLAM) 50 MG tablet Take  by mouth as needed. (Patient not taking: Reported on 1/3/2023)         PAST SURGICAL HISTORY:  Past Surgical History:   Procedure Laterality Date     HERNIORRHAPHY INGUINAL CHILD BILATERAL Bilateral      ZZC LUMBAR SPINE FUSION,ANTER APPRCH  2000       ALLERGIES  Promethazine, Cephalexin hcl, Adhesive tape, and Compazine [prochlorperazine]    FAMILY HX:  No family history on file.    SOCIAL HX:  Social History     Socioeconomic History     Marital status:      Spouse name: None     Number of children: None     Years of education: None     Highest education level: None   Tobacco Use     Smoking status: Never     Smokeless tobacco: Never   Substance and Sexual Activity     Alcohol use: Yes     Drug use: No     Sexual activity: Yes     Birth control/protection: I.U.D.       ROS:  Constitutional: No fever, chills, or sweats. No weight gain/loss.   ENT: No visual disturbance, ear ache, epistaxis, sore throat.   Allergies/Immunologic: Negative.   Respiratory: No cough, hemoptysis.   Cardiovascular: As per HPI.   GI: No nausea, vomiting, hematemesis, melena, or hematochezia.   : No urinary frequency, dysuria, or hematuria.   Integument: Negative.   Psychiatric: Negative.   Neuro: Negative.   Endocrinology: Negative.   Musculoskeletal: No myalgia.    VITAL SIGNS:  /66 (BP Location: Right arm, Patient Position: Sitting, Cuff Size: Adult Regular)   Pulse 58   Ht 1.785 m (5' 10.28\")   Wt 76 kg (167 lb 8 oz)   SpO2 98%   BMI 23.85 kg/m    Body mass index is 23.85 kg/m .  Wt Readings from Last 2 Encounters:   01/03/23 76 kg (167 lb 8 oz)   06/22/21 69.9 kg (154 lb)       PHYSICAL EXAM  Day Merlos IS A 32 year old female.in no acute distress.  HEENT: Unremarkable.  Neck: JVP normal.  Carotids +4/4 bilaterally without bruits.  Lungs: CTA.  Cor: RRR. Normal S1 and S2.  No murmur, rub, or gallop.  Abd: Soft  Extremities: No C/C/E.    Neuro: Grossly " intact.    LABS    Lab Results   Component Value Date    WBC 7.8 06/22/2021     Lab Results   Component Value Date    RBC 4.45 06/22/2021     Lab Results   Component Value Date    HGB 13.6 06/22/2021     Lab Results   Component Value Date    HCT 42.3 06/22/2021     No components found for: MCT  Lab Results   Component Value Date    MCV 95 06/22/2021     Lab Results   Component Value Date    MCH 30.6 06/22/2021     Lab Results   Component Value Date    MCHC 32.2 06/22/2021     Lab Results   Component Value Date    RDW 12.7 06/22/2021     Lab Results   Component Value Date     06/22/2021      Recent Labs   Lab Test 06/22/21  0932      POTASSIUM 3.7   CHLORIDE 105   CO2 27   ANIONGAP 6   GLC 92   BUN 7   CR 0.82   EILEEN 8.7     Recent Labs   Lab Test 06/22/21  0932   CHOL 197   HDL 71   *   TRIG 53   NHDL 126        ASSESSMENT AND PLAN:  #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 Merlos in Adult Congenital and Cardiovascular Genetics Clinic today. We discussed her interval history since I last saw her.  I agree that it seems her event playing volleyball with the 2 liters of free water and subsequent low sodium sounds secondary to too much free water too fast.  She is aware now to be careful in the future and does have electrolyte replacement.    She needs further evaluation after her fracture. Ordered a vitamin D level and bone mineral density that she can do at [facility].      Her blood pressure is under good control. She is on maximal dose of metoprolol that can be tolerated with her BP.  She knows about weight lifting and exercise restrictions. Will refer to PT for her musculoskeletal issues. Swimming is good form of exercise for her.       MRA is unchanged of the chest and abdomen with no concerning findings. Will call with MRA head results.  Will  plan to see her back with MRA of chest and pelvis in a year.       She understands and is in agreement with the plan. It was a pleasure to see her. Please do not hesitate to contact me with questions.      KENY Bardales MD   60 minutes face-face, documentation and review of records on day of visit      Please do not hesitate to contact me if you have any questions/concerns.     Sincerely,     Rosa Bardales MD

## 2023-01-03 NOTE — PATIENT INSTRUCTIONS
You were seen today in the Adult Congenital and Cardiovascular Genetics Clinic at the [facility].    Cardiology Providers you saw during your visit:  KENY Bardales MD    Diagnosis:  Galo Diamond    Results:  KENY Bardales MD reviewed the results of your MRA testing today in clinic.    Recommendations for you:    Referral to PT  Bone density scan order placed  Please have a vitamin D level drawn  Please have an echo  Your brother call can us for an appointment (depending on his insurance he may need a referral to come see us)      General Cardiac Recommendations:  Continue to eat a heart healthy, low salt diet.  Continue to get 20-30 minutes of aerobic activity, 4-5 days per week.  Examples of aerobic activity include walking, running, swimming, cycling, etc.  Continue to observe good oral hygiene, with regular dental visits.      SBE prophylaxis:   Yes____  No__x__      Exercise restrictions:   Yes__X__  No____         If yes, list restrictions:  Must be allowed to rest if fatigued or SOB      FASTING CHOLESTEROL was checked in the last 5 years YES_x__  NO___ (2021)      Follow-up:  Follow up with Dr Bardales in 1 year with head to toe MRAs.     If you have questions or concerns please contact us at:    Natali Freeman, RN, BSN   Arianna Zavala (Scheduling)  Nurse Care Coordinator     Clinic   Adult Congenital and CV Genetics   Adult Congenital and CV Genetic  [facility]   [facility]  (P) [phone]     (P) [phone]            (F) [phone]        For after hours urgent needs, call [phone] and ask to speak to the Adult Congenital Physician on call.  Mention Job Code 0401.    For emergencies call 91.    [facility]  [facility]   [facility]  Mail Code [id number]  [address] [place] [address], [place]  [address]

## 2023-01-03 NOTE — PROGRESS NOTES
CARDIOLOGY CONSULTATION:  Ms. Merlos is a 32 year old woman with a history of gene positivity for Loeys Dara Syndrome whom I met in . She also has a history of POTS syndrome that is managed with compression stockings, beta blockers and fluids.      Ms. Solorzano has no history of aneurysmal disease.   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      Family history is remarkable for mitral and aortic valve replacement in her mom at a young age (she  5 years ago at age 54 from thrombosed valve). She has one brother her also has Loeys Dara and has been fine - but has not been seen.  Father is healthy. Maternal aunt passed at 62 years old unknown etiology. Maternal uncle passed at 59 of unknown etiology. Other aunt passed at 63 due to fall and brain bleed.       She really enjoys volleyball for exercise and has been careful about physical contact with that.  She stopped playing basketball. Her  is a physical therapist and is helping her with rehab.     She has migraine headaches that are mostly managed with Tylenol and gabapentin.  She also has some venous insufficiency for which she uses compression stockings.     She had imagining of her head today that we will compare to that from 2016 when she saw Dr. Mark, but it is not back yet.   He stated that he did not see any obvious aneurysm or dissection at that time, focusing on the paraclinoid and supraclinoid internal carotid arteries as the few reports in the literature have described aneurysms in these locations in association with Loeys-Dara.  There is an infundibulum at the left posterior communicating artery origin that is known.     In the Fall she was playing volleyball indoors and she drank 2 liters of free water and she felt really ill.  She ended up in the ED and her  sodium was 126.  That has since corrected but it does seem that this was all related to overhydration of free water.  She broke her shoulder in pathologic location slipping on ice last winter and has not had an evaluation for osteoporosis or vitamin D level. Serum calcium, LFTs, TSH creatinine are all normal. Not a smoker or drinker and not a diabetic.      PAST MEDICAL HISTORY:  Past Medical History:   Diagnosis Date     Anxiety      Depression      Loeys-Dara syndrome      Migraine      POTS (postural orthostatic tachycardia syndrome)      Scoliosis     s/p lumbar spine fusion        CURRENT MEDICATIONS:  Current Outpatient Medications   Medication Sig Dispense Refill     baclofen (LIORESAL) 10 MG tablet Take 10 mg by mouth 3 times daily       celecoxib (CELEBREX) 100 MG capsule        COMPRESSION STOCKINGS 1 each daily 1 each 1     cyclobenzaprine (FLEXERIL) 10 MG tablet Take 10 mg by mouth as needed.       FLUoxetine HCl (PROZAC PO) Take 20 mg by mouth daily       GABAPENTIN PO Take 300 mg by mouth daily        metoprolol succinate ER (TOPROL-XL) 25 MG 24 hr tablet TAKE 1 TABLET BY MOUTH ONCE DAILY (CALL CLINIC TO SCHEDULE FOLLOW UP APPT PLEASE) 90 tablet 0     midodrine (PROAMATINE) 5 MG tablet Take 1 tablet (5 mg) by mouth 2 times daily Please call [phone] to set up follow up with Dr. Bardales in [place] 180 tablet 3     Multiple Vitamin (DAILY MULTIVITAMIN PO) Take  by mouth.       ondansetron (ZOFRAN) 4 MG tablet Take 1 tablet (4 mg) by mouth every 8 hours as needed for nausea (for CT dye nausea) 2 tablet 0     Zolpidem Tartrate (AMBIEN PO) Take 1.75 mg by mouth At Bedtime        diclofenac (CATAFLAM) 50 MG tablet Take  by mouth as needed. (Patient not taking: Reported on 1/3/2023)         PAST SURGICAL HISTORY:  Past Surgical History:   Procedure Laterality Date     HERNIORRHAPHY INGUINAL CHILD BILATERAL Bilateral      ZZC LUMBAR SPINE FUSION,ANTER APPRCH  2000       ALLERGIES  Promethazine, Cephalexin  "hcl, Adhesive tape, and Compazine [prochlorperazine]    FAMILY HX:  No family history on file.    SOCIAL HX:  Social History     Socioeconomic History     Marital status:      Spouse name: None     Number of children: None     Years of education: None     Highest education level: None   Tobacco Use     Smoking status: Never     Smokeless tobacco: Never   Substance and Sexual Activity     Alcohol use: Yes     Drug use: No     Sexual activity: Yes     Birth control/protection: I.U.D.       ROS:  Constitutional: No fever, chills, or sweats. No weight gain/loss.   ENT: No visual disturbance, ear ache, epistaxis, sore throat.   Allergies/Immunologic: Negative.   Respiratory: No cough, hemoptysis.   Cardiovascular: As per HPI.   GI: No nausea, vomiting, hematemesis, melena, or hematochezia.   : No urinary frequency, dysuria, or hematuria.   Integument: Negative.   Psychiatric: Negative.   Neuro: Negative.   Endocrinology: Negative.   Musculoskeletal: No myalgia.    VITAL SIGNS:  /66 (BP Location: Right arm, Patient Position: Sitting, Cuff Size: Adult Regular)   Pulse 58   Ht 1.785 m (5' 10.28\")   Wt 76 kg (167 lb 8 oz)   SpO2 98%   BMI 23.85 kg/m    Body mass index is 23.85 kg/m .  Wt Readings from Last 2 Encounters:   01/03/23 76 kg (167 lb 8 oz)   06/22/21 69.9 kg (154 lb)       PHYSICAL EXAM  Day Merlos IS A 32 year old female.in no acute distress.  HEENT: Unremarkable.  Neck: JVP normal.  Carotids +4/4 bilaterally without bruits.  Lungs: CTA.  Cor: RRR. Normal S1 and S2.  No murmur, rub, or gallop.  Abd: Soft  Extremities: No C/C/E.    Neuro: Grossly intact.    LABS    Lab Results   Component Value Date    WBC 7.8 06/22/2021     Lab Results   Component Value Date    RBC 4.45 06/22/2021     Lab Results   Component Value Date    HGB 13.6 06/22/2021     Lab Results   Component Value Date    HCT 42.3 06/22/2021     No components found for: MCT  Lab Results   Component Value Date    MCV 95 " 06/22/2021     Lab Results   Component Value Date    MCH 30.6 06/22/2021     Lab Results   Component Value Date    MCHC 32.2 06/22/2021     Lab Results   Component Value Date    RDW 12.7 06/22/2021     Lab Results   Component Value Date     06/22/2021      Recent Labs   Lab Test 06/22/21  0932      POTASSIUM 3.7   CHLORIDE 105   CO2 27   ANIONGAP 6   GLC 92   BUN 7   CR 0.82   EILEEN 8.7     Recent Labs   Lab Test 06/22/21  0932   CHOL 197   HDL 71   *   TRIG 53   NHDL 126        ASSESSMENT AND PLAN:  #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 Merlos in Adult Congenital and Cardiovascular Genetics Clinic today. We discussed her interval history since I last saw her.  I agree that it seems her event playing volleyball with the 2 liters of free water and subsequent low sodium sounds secondary to too much free water too fast.  She is aware now to be careful in the future and does have electrolyte replacement.    She needs further evaluation after her fracture. Ordered a vitamin D level and bone mineral density that she can do at [facility].      Her blood pressure is under good control. She is on maximal dose of metoprolol that can be tolerated with her BP.  She knows about weight lifting and exercise restrictions. Will refer to PT for her musculoskeletal issues. Swimming is good form of exercise for her.       MRA is unchanged of the chest and abdomen with no concerning findings. Will call with MRA head results.  Will plan to see her back with MRA of chest and pelvis in a year.       She understands and is in agreement with the plan. It was a pleasure to see her. Please do not hesitate to contact me with questions.      KENY Bardales MD   60 minutes face-face, documentation and review of records on day of visit

## 2023-01-03 NOTE — NURSING NOTE
Chief Complaint   Patient presents with     Follow Up     CV Genetics 06/22/2021: 30 year old female with history of Loey's Dara, type 4 presenting for evaluation.         Vitals were taken, medications reconciled and EKG performed.     Herrera Ferreira, EMT   2:59 PM

## 2023-01-04 LAB
ATRIAL RATE - MUSE: 53 BPM
DIASTOLIC BLOOD PRESSURE - MUSE: NORMAL MMHG
INTERPRETATION ECG - MUSE: NORMAL
P AXIS - MUSE: -26 DEGREES
PR INTERVAL - MUSE: 180 MS
QRS DURATION - MUSE: 84 MS
QT - MUSE: 428 MS
QTC - MUSE: 401 MS
R AXIS - MUSE: 79 DEGREES
SYSTOLIC BLOOD PRESSURE - MUSE: NORMAL MMHG
T AXIS - MUSE: 55 DEGREES
VENTRICULAR RATE- MUSE: 53 BPM

## 2023-01-12 ENCOUNTER — TELEPHONE (OUTPATIENT)
Dept: CARDIOLOGY | Facility: CLINIC | Age: 33
End: 2023-01-12

## 2023-01-12 NOTE — TELEPHONE ENCOUNTER
Health Call Center    Phone Message    May a detailed message be left on voicemail: yes     Reason for Call: Other: Pt is not allowed to have Lidocain.  If this is the case, can they use a sailine substitute for injectitons?  Please call BETO Yanez  [phone] advise, Lupe is  working w/the pt.    Action Taken: Message routed to:  [facility] ([facility]): cardio    Travel Screening: Not Applicable     Thank you!  [facility]

## 2023-01-12 NOTE — TELEPHONE ENCOUNTER
Spoke with Lupe. Pt was advised at last visit to not have lidocaine trigger injections any more and Lupe would like to know if saline or dry needling are ok for patients lumbar pain    Message sent to Dr Bardales to clarify

## 2023-01-16 NOTE — TELEPHONE ENCOUNTER
Unable to reach Lupe Vale, pt is updated via [employer] message on Dr Bardales's response    Hello -   We advise against injections in patients with LD because of concerns with vasculature and connective tissue and fragility.

## 2023-02-14 DIAGNOSIS — Z82.49 FAMILIAL AORTIC ANEURYSM: ICD-10-CM

## 2023-02-14 DIAGNOSIS — R23.4 THIN SKIN: ICD-10-CM

## 2023-02-14 DIAGNOSIS — M24.80 JOINT HYPEREXTENSIBILITY OF MULTIPLE SITES: ICD-10-CM

## 2023-02-15 NOTE — TELEPHONE ENCOUNTER
midodrine (PROAMATINE) 5 MG  Last Written Prescription Date:  11/4/21  Last Fill Quantity:  180,   # refills: 3  Last Office Visit : 1/3/23   Future Office visit:  NONE  Routing refill request to provider for review/approval because:  Drug not on the refill protocol

## 2023-02-16 RX ORDER — MIDODRINE HYDROCHLORIDE 5 MG/1
5 TABLET ORAL 2 TIMES DAILY
Qty: 180 TABLET | Refills: 3 | Status: SHIPPED | OUTPATIENT
Start: 2023-02-16

## 2023-06-14 ENCOUNTER — MYC MEDICAL ADVICE (OUTPATIENT)
Dept: CARDIOLOGY | Facility: CLINIC | Age: 33
End: 2023-06-14
Payer: COMMERCIAL

## 2023-09-02 ENCOUNTER — HEALTH MAINTENANCE LETTER (OUTPATIENT)
Age: 33
End: 2023-09-02

## 2024-08-15 ENCOUNTER — HOSPITAL ENCOUNTER (OUTPATIENT)
Dept: MRI IMAGING | Facility: CLINIC | Age: 34
Discharge: HOME | End: 2024-08-15
Attending: INTERNAL MEDICINE
Payer: COMMERCIAL

## 2024-08-15 ENCOUNTER — HOSPITAL ENCOUNTER (OUTPATIENT)
Dept: CARDIOLOGY | Facility: CLINIC | Age: 34
Discharge: HOME | End: 2024-08-15
Attending: INTERNAL MEDICINE
Payer: COMMERCIAL

## 2024-08-15 DIAGNOSIS — Z82.49 FAMILIAL AORTIC ANEURYSM: ICD-10-CM

## 2024-08-15 DIAGNOSIS — G90.A POTS (POSTURAL ORTHOSTATIC TACHYCARDIA SYNDROME): ICD-10-CM

## 2024-08-15 DIAGNOSIS — Q87.89 LOEYS-DIETZ SYNDROME TYPE 4: ICD-10-CM

## 2024-08-15 DIAGNOSIS — M24.80 JOINT HYPEREXTENSIBILITY OF MULTIPLE SITES: ICD-10-CM

## 2024-08-15 LAB — LVEF ECHO: NORMAL

## 2024-08-15 PROCEDURE — 93306 TTE W/DOPPLER COMPLETE: CPT | Mod: 26 | Performed by: INTERNAL MEDICINE

## 2024-08-15 PROCEDURE — 72198 MR ANGIO PELVIS W/O & W/DYE: CPT

## 2024-08-15 PROCEDURE — 74185 MRA ABD W OR W/O CNTRST: CPT

## 2024-08-15 PROCEDURE — 255N000002 HC RX 255 OP 636: Performed by: INTERNAL MEDICINE

## 2024-08-15 PROCEDURE — 74185 MRA ABD W OR W/O CNTRST: CPT | Mod: 26 | Performed by: INTERNAL MEDICINE

## 2024-08-15 PROCEDURE — A9585 GADOBUTROL INJECTION: HCPCS | Performed by: INTERNAL MEDICINE

## 2024-08-15 PROCEDURE — 71555 MRI ANGIO CHEST W OR W/O DYE: CPT

## 2024-08-15 PROCEDURE — 93306 TTE W/DOPPLER COMPLETE: CPT

## 2024-08-15 PROCEDURE — 72198 MR ANGIO PELVIS W/O & W/DYE: CPT | Mod: 26 | Performed by: INTERNAL MEDICINE

## 2024-08-15 PROCEDURE — 71555 MRI ANGIO CHEST W OR W/O DYE: CPT | Mod: 26 | Performed by: INTERNAL MEDICINE

## 2024-08-15 RX ORDER — GADOBUTROL 604.72 MG/ML
20 INJECTION INTRAVENOUS ONCE
Status: COMPLETED | OUTPATIENT
Start: 2024-08-15 | End: 2024-08-15

## 2024-08-15 RX ADMIN — GADOBUTROL 20 ML: 604.72 INJECTION INTRAVENOUS at 15:12

## 2024-09-17 ENCOUNTER — HOSPITAL ENCOUNTER (OUTPATIENT)
Dept: MRI IMAGING | Facility: CLINIC | Age: 34
Discharge: HOME | End: 2024-09-17
Attending: INTERNAL MEDICINE
Payer: COMMERCIAL

## 2024-09-17 ENCOUNTER — LAB (OUTPATIENT)
Dept: LAB | Facility: CLINIC | Age: 34
End: 2024-09-17
Attending: INTERNAL MEDICINE
Payer: COMMERCIAL

## 2024-09-17 ENCOUNTER — OFFICE VISIT (OUTPATIENT)
Dept: CARDIOLOGY | Facility: CLINIC | Age: 34
End: 2024-09-17
Attending: INTERNAL MEDICINE
Payer: COMMERCIAL

## 2024-09-17 ENCOUNTER — ORDERS ONLY (AUTO-RELEASED) (OUTPATIENT)
Dept: CARDIOLOGY | Facility: CLINIC | Age: 34
End: 2024-09-17

## 2024-09-17 VITALS
OXYGEN SATURATION: 99 % | SYSTOLIC BLOOD PRESSURE: 117 MMHG | BODY MASS INDEX: 23.82 KG/M2 | DIASTOLIC BLOOD PRESSURE: 72 MMHG | WEIGHT: 167.3 LBS | HEART RATE: 47 BPM

## 2024-09-17 DIAGNOSIS — R00.2 PALPITATIONS: ICD-10-CM

## 2024-09-17 DIAGNOSIS — Z82.49 FAMILIAL AORTIC ANEURYSM: ICD-10-CM

## 2024-09-17 DIAGNOSIS — M24.80 JOINT HYPEREXTENSIBILITY OF MULTIPLE SITES: ICD-10-CM

## 2024-09-17 DIAGNOSIS — G90.A POTS (POSTURAL ORTHOSTATIC TACHYCARDIA SYNDROME): ICD-10-CM

## 2024-09-17 DIAGNOSIS — Q87.89 LOEYS-DIETZ SYNDROME TYPE 4: ICD-10-CM

## 2024-09-17 DIAGNOSIS — Q89.8 STICKLER SYNDROME: Primary | ICD-10-CM

## 2024-09-17 DIAGNOSIS — H90.8 MIXED CONDUCTIVE AND SENSORINEURAL HEARING LOSS, UNSPECIFIED LATERALITY: ICD-10-CM

## 2024-09-17 DIAGNOSIS — Q89.8 STICKLER SYNDROME: ICD-10-CM

## 2024-09-17 LAB
ALBUMIN SERPL BCG-MCNC: 4.2 G/DL (ref 3.5–5.2)
ALP SERPL-CCNC: 71 U/L (ref 40–150)
ALT SERPL W P-5'-P-CCNC: 8 U/L (ref 0–50)
ANION GAP SERPL CALCULATED.3IONS-SCNC: 12 MMOL/L (ref 7–15)
AST SERPL W P-5'-P-CCNC: 12 U/L (ref 0–45)
BASOPHILS # BLD AUTO: 0 10E3/UL (ref 0–0.2)
BASOPHILS NFR BLD AUTO: 0 %
BILIRUB SERPL-MCNC: <0.2 MG/DL
BUN SERPL-MCNC: 7.5 MG/DL (ref 6–20)
CALCIUM SERPL-MCNC: 9.3 MG/DL (ref 8.8–10.4)
CHLORIDE SERPL-SCNC: 101 MMOL/L (ref 98–107)
CREAT SERPL-MCNC: 0.68 MG/DL (ref 0.51–0.95)
EGFRCR SERPLBLD CKD-EPI 2021: >90 ML/MIN/1.73M2
EOSINOPHIL # BLD AUTO: 0.2 10E3/UL (ref 0–0.7)
EOSINOPHIL NFR BLD AUTO: 2 %
ERYTHROCYTE [DISTWIDTH] IN BLOOD BY AUTOMATED COUNT: 13.2 % (ref 10–15)
GLUCOSE SERPL-MCNC: 77 MG/DL (ref 70–99)
HCO3 SERPL-SCNC: 26 MMOL/L (ref 22–29)
HCT VFR BLD AUTO: 41.7 % (ref 35–47)
HGB BLD-MCNC: 13.4 G/DL (ref 11.7–15.7)
IMM GRANULOCYTES # BLD: 0 10E3/UL
IMM GRANULOCYTES NFR BLD: 0 %
LYMPHOCYTES # BLD AUTO: 2.3 10E3/UL (ref 0.8–5.3)
LYMPHOCYTES NFR BLD AUTO: 35 %
MCH RBC QN AUTO: 30.5 PG (ref 26.5–33)
MCHC RBC AUTO-ENTMCNC: 32.1 G/DL (ref 31.5–36.5)
MCV RBC AUTO: 95 FL (ref 78–100)
MONOCYTES # BLD AUTO: 0.5 10E3/UL (ref 0–1.3)
MONOCYTES NFR BLD AUTO: 8 %
NEUTROPHILS # BLD AUTO: 3.4 10E3/UL (ref 1.6–8.3)
NEUTROPHILS NFR BLD AUTO: 54 %
NRBC # BLD AUTO: 0 10E3/UL
NRBC BLD AUTO-RTO: 0 /100
PLATELET # BLD AUTO: 337 10E3/UL (ref 150–450)
POTASSIUM SERPL-SCNC: 4 MMOL/L (ref 3.4–5.3)
PROT SERPL-MCNC: 6.8 G/DL (ref 6.4–8.3)
RBC # BLD AUTO: 4.4 10E6/UL (ref 3.8–5.2)
SODIUM SERPL-SCNC: 139 MMOL/L (ref 135–145)
VIT D+METAB SERPL-MCNC: 40 NG/ML (ref 20–50)
WBC # BLD AUTO: 6.4 10E3/UL (ref 4–11)

## 2024-09-17 PROCEDURE — 93005 ELECTROCARDIOGRAM TRACING: CPT | Performed by: INTERNAL MEDICINE

## 2024-09-17 PROCEDURE — 82306 VITAMIN D 25 HYDROXY: CPT

## 2024-09-17 PROCEDURE — 70549 MR ANGIOGRAPH NECK W/O&W/DYE: CPT | Mod: 26 | Performed by: RADIOLOGY

## 2024-09-17 PROCEDURE — 99215 OFFICE O/P EST HI 40 MIN: CPT | Performed by: INTERNAL MEDICINE

## 2024-09-17 PROCEDURE — 99213 OFFICE O/P EST LOW 20 MIN: CPT | Performed by: INTERNAL MEDICINE

## 2024-09-17 PROCEDURE — 85041 AUTOMATED RBC COUNT: CPT

## 2024-09-17 PROCEDURE — 255N000002 HC RX 255 OP 636: Performed by: INTERNAL MEDICINE

## 2024-09-17 PROCEDURE — 93005 ELECTROCARDIOGRAM TRACING: CPT

## 2024-09-17 PROCEDURE — A9585 GADOBUTROL INJECTION: HCPCS | Performed by: INTERNAL MEDICINE

## 2024-09-17 PROCEDURE — 70549 MR ANGIOGRAPH NECK W/O&W/DYE: CPT

## 2024-09-17 PROCEDURE — 80053 COMPREHEN METABOLIC PANEL: CPT

## 2024-09-17 PROCEDURE — 36415 COLL VENOUS BLD VENIPUNCTURE: CPT

## 2024-09-17 PROCEDURE — 70544 MR ANGIOGRAPHY HEAD W/O DYE: CPT

## 2024-09-17 RX ORDER — GADOBUTROL 604.72 MG/ML
7.5 INJECTION INTRAVENOUS ONCE
Status: COMPLETED | OUTPATIENT
Start: 2024-09-17 | End: 2024-09-17

## 2024-09-17 RX ADMIN — GADOBUTROL 7.5 ML: 604.72 INJECTION INTRAVENOUS at 13:04

## 2024-09-17 ASSESSMENT — PAIN SCALES - GENERAL: PAINLEVEL: NO PAIN (0)

## 2024-09-17 NOTE — PROGRESS NOTES
CARDIOLOGY CONSULTATION:    Ms. 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  She recalls she also was positive for Stickler syndrome, but we do not have the official genetic test results to confrim  She also has a history of POTS syndrome that is managed with compression stockings, beta blockers and fluids.      Ms. Solorzano has no history of aneurysmal disease.   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. She had wanted to carry a pregnancy and had seen M but has been unsuccessful so now is trying not to stress about it and they are OK if they do not have children.  She has a niece now that is 10 months old.      Family history is remarkable for mitral and aortic valve replacement in her mom at a young age (she  at age 54 from thrombosed valve). She has one brother her also has Loeys Dara and has been fine - but has not been seen.  Father is healthy. Maternal aunt passed at 62 years, unknown etiology. Maternal uncle passed at 59 of unknown etiology. Other aunt passed at 63 due to fall and brain bleed.       She really enjoys volleyball for exercise and has been careful about physical contact with that.  She stopped playing basketball. Her  is a physical therapist and is helping her with rehab. She unfortunately had a partial ACL tear while playing she is doing PT for.  She is not exercising as much as she did in the past.      She has migraine headaches that are mostly managed with Tylenol and gabapentin.  She also has some venous insufficiency for which she uses compression stockings.      She has had imagining of her head, last before today 2023, and that has been negative for aneurysm (today is pending). She also had had imaging of her chest, abdomen and pelvis, and she continues to have no evidence of aneurysm.   Echo 2024 showed no MVP and trace MR.  There were no abnormalities on the echo.  She see ophthalmology yearly and no history of lens detachment. She has noted that she is not hearing as well.     BP has been controlled. She notes that her pulse is low at times and wonders about that. She has not had a monitor.  She is on Toprol XL 25 QAM  Looking forward to trip to [place]. 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        PAST MEDICAL HISTORY:  Past Medical History:   Diagnosis Date    Anxiety     Depression     Loeys-Dara syndrome     Migraine     POTS (postural orthostatic tachycardia syndrome)     Scoliosis     s/p lumbar spine fusion        CURRENT MEDICATIONS:  Current Outpatient Medications   Medication Sig Dispense Refill    baclofen (LIORESAL) 10 MG tablet Take 10 mg by mouth 3 times daily      celecoxib (CELEBREX) 100 MG capsule       COMPRESSION STOCKINGS 1 each daily 1 each 1    cyclobenzaprine (FLEXERIL) 10 MG tablet Take 10 mg by mouth as needed.      diclofenac (CATAFLAM) 50 MG tablet Take  by mouth as needed. (Patient not taking: Reported on 1/3/2023)      FLUoxetine HCl (PROZAC PO) Take 20 mg by mouth daily      GABAPENTIN PO Take 300 mg by mouth daily       metoprolol succinate ER (TOPROL-XL) 25 MG 24 hr tablet TAKE 1 TABLET BY MOUTH ONCE DAILY (CALL CLINIC TO SCHEDULE FOLLOW UP APPT PLEASE) 90 tablet 0    midodrine (PROAMATINE) 5 MG tablet Take 1 tablet (5 mg) by mouth 2 times daily 180 tablet 3    Multiple Vitamin (DAILY MULTIVITAMIN PO) Take  by mouth.      ondansetron (ZOFRAN) 4 MG tablet Take 1 tablet (4 mg) by mouth every 8 hours as needed for nausea (for CT dye nausea) 2 tablet 0    Zolpidem Tartrate (AMBIEN PO) Take 1.75 mg by mouth At Bedtime          PAST SURGICAL HISTORY:  Past Surgical History:   Procedure Laterality Date    HERNIORRHAPHY INGUINAL CHILD BILATERAL Bilateral     ZZC LUMBAR SPINE FUSION,ANTER APPRCH  2000       ALLERGIES  Promethazine, Cephalexin hcl, Adhesive tape, and Compazine  "[prochlorperazine]    FAMILY HX:  No family history on file.    SOCIAL HX:  Social History     Socioeconomic History    Marital status:    Tobacco Use    Smoking status: Never    Smokeless tobacco: Never   Substance and Sexual Activity    Alcohol use: Yes    Drug use: No    Sexual activity: Yes     Birth control/protection: I.U.D.       ROS:  Constitutional: No fever, chills, or sweats. No weight gain/loss.   ENT: No visual disturbance, ear ache, epistaxis, sore throat.   Allergies/Immunologic: Negative.   Respiratory: No cough, hemoptysis.   Cardiovascular: As per HPI.   GI: No nausea, vomiting, hematemesis, melena, or hematochezia.   : No urinary frequency, dysuria, or hematuria.   Integument: Negative.   Psychiatric: Negative.   Neuro: Negative.   Endocrinology: Negative.   Musculoskeletal: No myalgia.    VITAL SIGNS:  /72 (BP Location: Right arm, Patient Position: Supine, Cuff Size: Adult Regular)   Pulse (!) 47   Wt 75.9 kg (167 lb 4.8 oz)   SpO2 99%   BMI 23.82 kg/m    Body mass index is 23.82 kg/m .  Wt Readings from Last 2 Encounters:   09/17/24 75.9 kg (167 lb 4.8 oz)   01/03/23 76 kg (167 lb 8 oz)       PHYSICAL EXAM  Day Merlos IS A 33 year old female.in no acute distress.  HEENT: Unremarkable.  Neck: JVP normal.   Lungs: CTA.  Cor: RRR. Normal S1 and S2.  No murmur, rub, or gallop.    Abd: Soft, nontender, nondistended.  NABS.  xtremities: No C/C/E.    Neuro: Grossly intact.    LABS    Lab Results   Component Value Date    WBC 7.8 06/22/2021     Lab Results   Component Value Date    RBC 4.45 06/22/2021     Lab Results   Component Value Date    HGB 13.6 06/22/2021     Lab Results   Component Value Date    HCT 42.3 06/22/2021     No components found for: \"MCT\"  Lab Results   Component Value Date    MCV 95 06/22/2021     Lab Results   Component Value Date    MCH 30.6 06/22/2021     Lab Results   Component Value Date    MCHC 32.2 06/22/2021     Lab Results   Component Value Date    " RDW 12.7 06/22/2021     Lab Results   Component Value Date     06/22/2021      Recent Labs   Lab Test 09/17/24  1233 06/22/21  0932    138   POTASSIUM 4.0 3.7   CHLORIDE 101 105   CO2 26 27   ANIONGAP 12 6   GLC 77 92   BUN 7.5 7   CR 0.68 0.82   EILEEN 9.3 8.7     Recent Labs   Lab Test 06/22/21  0932   CHOL 197   HDL 71   *   TRIG 53   NHDL 126        ASSESSMENT AND PLAN:  #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 Merlos in [facility] Clinic today. We discussed her interval history since I last saw her.   She unfortunately suffered an injury from her joint laxity she is recovering from.  She Will explore other exercise, such as yoga and walking.      Her blood pressure is under excellent control. She does note her HR has been low at times. Discussed options and will send out a 2 week zio patch.     She already see ophthalmology. Will refer to audiology. Echo shows no mitral valve disease.     Will refer to genetics to see about getting her genetic testing or repeating it so verify the Stickler Syndrome     MRA is unchanged of the chest and abdomen with no concerning findings. Will call with MRA head results.  Will plan to see her back in 12-24 months with MRA of braine, chest and pelvis.       She understands and is in agreement with the plan. It was a pleasure to see her. Please do not hesitate to contact me with questions.      KENY Bardales MD   45 minutes face-face, documentation and review of records on day of visit

## 2024-09-17 NOTE — PATIENT INSTRUCTIONS
"Thank you for visiting the Adult Congenital and Cardiovascular Genetics Clinic at the [facility].    Cardiology Providers you saw during your visit:  KENY Bardales MD    Diagnosis:  Loeys Dara Syndrome    Results:  KENY Bardales MD reviewed the results of your EKG, lab, echo and MRA testing today in clinic.    If you have questions or concerns, please call us at [phone] or contact us through [facility].  ______________________________________________________________________________    Recommendations from your Cardiology Provider TODAY:     We will update you on the results of your head MRA when is has resulted  Audiology referral placed  3.   Referral for genetic counseling placed   4.   Complete a 2 week zio patch, we will mail this out and update you with results       ____________________________________________________________________________________________________    Follow-up Plan:  Follow up with Dr Bardales in 1-2 years with head to pelvis MRA's prior    ____________________________________________________________________________________________________    If you have questions or concerns, please call us at [phone] or contact us through [facility]. Our fax number is [phone]    Natali Freeman RN RN, BSJEANETTE Gibson (Scheduling)  Nurse Care Coordinator     Clinic   Adult Congenital and CV Genetics              Adult Congenital and CV Genetics  [facility]              [facility]        For after hours urgent needs, call [phone] and ask to speak to the \"On-Call Cardiologist.\"    For emergencies call 911.      ____________________________________________________________________________________________________    Additional Important Information for Your Heart Health      General Cardiac Recommendations:  Continue to eat a heart healthy, low salt diet.  Continue to get 20-30 minutes of aerobic activity, " 4-5 days per week.  Examples of aerobic activity include walking, running, swimming, cycling, etc.  Continue to observe good oral hygiene, with regular dental visits.        SBE prophylaxis (antibiotics needed before dental appointments):   Yes____  No_X___    SBE prophylaxis applies to patients with certain heart conditions who are recommended to take antibiotics before dental appointments and other specific procedures. These antibiotics are to help prevent an infection of the heart (endocarditis) that certain patients are at higher risk of developing. The guidelines used come from the American Heart Association and are periodically updated.      FASTING CHOLESTEROL was checked in the last 5 years YES___X_  NO____ (2021)  If no, please follow up with your primary care physician. You should have a cholesterol screening every 5 years at minimum, and every year if taking a medication for your cholesterol levels.

## 2024-09-17 NOTE — LETTER
2024      RE: Day Merlos  [address] [address]       Dear Colleague,    Thank you for the opportunity to participate in the care of your patient, Day Merlos, at the [facility]. Please see a copy of my visit note below.    CARDIOLOGY CONSULTATION:    Ms. 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  She recalls she also was positive for Stickler syndrome, but we do not have the official genetic test results to confrim  She also has a history of POTS syndrome that is managed with compression stockings, beta blockers and fluids.      Ms. Solorzano has no history of aneurysmal disease.   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  She has a niece now that is 10 months old.      Family history is remarkable for mitral and aortic valve replacement in her mom at a young age (she  at age 54 from thrombosed valve). She has one brother her also has Loeys Dara and has been fine - but has not been seen.  Father is healthy. Maternal aunt passed at 62 years, unknown etiology. Maternal uncle passed at 59 of unknown etiology. Other aunt passed at 63 due to fall and brain bleed.       She really enjoys volleyball for exercise and has been careful about physical contact with that.  She stopped playing basketball. Her  is a physical therapist and is helping her with rehab. She unfortunately had a partial ACL tear while playing she is doing PT for.  She is not exercising as much as she did in the past.      She has migraine headaches that are mostly managed with Tylenol and gabapentin.  She also has some  venous insufficiency for which she uses compression stockings.      She has had imagining of her head, last before today 1/2023, and that has been negative for aneurysm (today is pending). She also had had imaging of her chest, abdomen and pelvis, and she continues to have no evidence of aneurysm.   Echo 8/2024 showed no MVP and trace MR. There were no abnormalities on the echo.  She see ophthalmology yearly and no history of lens detachment. She has noted that she is not hearing as well.     BP has been controlled. She notes that her pulse is low at times and wonders about that. She has not had a monitor.  She is on Toprol XL 25 QAM  Looking forward to trip to [place]. 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        PAST MEDICAL HISTORY:  Past Medical History:   Diagnosis Date     Anxiety      Depression      Loeys-Dara syndrome      Migraine      POTS (postural orthostatic tachycardia syndrome)      Scoliosis     s/p lumbar spine fusion        CURRENT MEDICATIONS:  Current Outpatient Medications   Medication Sig Dispense Refill     baclofen (LIORESAL) 10 MG tablet Take 10 mg by mouth 3 times daily       celecoxib (CELEBREX) 100 MG capsule        COMPRESSION STOCKINGS 1 each daily 1 each 1     cyclobenzaprine (FLEXERIL) 10 MG tablet Take 10 mg by mouth as needed.       diclofenac (CATAFLAM) 50 MG tablet Take  by mouth as needed. (Patient not taking: Reported on 1/3/2023)       FLUoxetine HCl (PROZAC PO) Take 20 mg by mouth daily       GABAPENTIN PO Take 300 mg by mouth daily        metoprolol succinate ER (TOPROL-XL) 25 MG 24 hr tablet TAKE 1 TABLET BY MOUTH ONCE DAILY (CALL CLINIC TO SCHEDULE FOLLOW UP APPT PLEASE) 90 tablet 0     midodrine (PROAMATINE) 5 MG tablet Take 1 tablet (5 mg) by mouth 2 times daily 180 tablet 3     Multiple Vitamin (DAILY MULTIVITAMIN PO) Take  by mouth.       ondansetron (ZOFRAN) 4 MG tablet Take 1 tablet (4 mg) by mouth every 8 hours as needed for nausea (for CT dye  nausea) 2 tablet 0     Zolpidem Tartrate (AMBIEN PO) Take 1.75 mg by mouth At Bedtime          PAST SURGICAL HISTORY:  Past Surgical History:   Procedure Laterality Date     HERNIORRHAPHY INGUINAL CHILD BILATERAL Bilateral      ZZC LUMBAR SPINE FUSION,ANTER APPRCH  2000       ALLERGIES  Promethazine, Cephalexin hcl, Adhesive tape, and Compazine [prochlorperazine]    FAMILY HX:  No family history on file.    SOCIAL HX:  Social History     Socioeconomic History     Marital status:    Tobacco Use     Smoking status: Never     Smokeless tobacco: Never   Substance and Sexual Activity     Alcohol use: Yes     Drug use: No     Sexual activity: Yes     Birth control/protection: I.U.D.       ROS:  Constitutional: No fever, chills, or sweats. No weight gain/loss.   ENT: No visual disturbance, ear ache, epistaxis, sore throat.   Allergies/Immunologic: Negative.   Respiratory: No cough, hemoptysis.   Cardiovascular: As per HPI.   GI: No nausea, vomiting, hematemesis, melena, or hematochezia.   : No urinary frequency, dysuria, or hematuria.   Integument: Negative.   Psychiatric: Negative.   Neuro: Negative.   Endocrinology: Negative.   Musculoskeletal: No myalgia.    VITAL SIGNS:  /72 (BP Location: Right arm, Patient Position: Supine, Cuff Size: Adult Regular)   Pulse (!) 47   Wt 75.9 kg (167 lb 4.8 oz)   SpO2 99%   BMI 23.82 kg/m    Body mass index is 23.82 kg/m .  Wt Readings from Last 2 Encounters:   09/17/24 75.9 kg (167 lb 4.8 oz)   01/03/23 76 kg (167 lb 8 oz)       PHYSICAL EXAM  Day Merlos IS A 33 year old female.in no acute distress.  HEENT: Unremarkable.  Neck: JVP normal.   Lungs: CTA.  Cor: RRR. Normal S1 and S2.  No murmur, rub, or gallop.    Abd: Soft, nontender, nondistended.  NABS.  xtremities: No C/C/E.    Neuro: Grossly intact.    LABS    Lab Results   Component Value Date    WBC 7.8 06/22/2021     Lab Results   Component Value Date    RBC 4.45 06/22/2021     Lab Results   Component  "Value Date    HGB 13.6 06/22/2021     Lab Results   Component Value Date    HCT 42.3 06/22/2021     No components found for: \"MCT\"  Lab Results   Component Value Date    MCV 95 06/22/2021     Lab Results   Component Value Date    MCH 30.6 06/22/2021     Lab Results   Component Value Date    MCHC 32.2 06/22/2021     Lab Results   Component Value Date    RDW 12.7 06/22/2021     Lab Results   Component Value Date     06/22/2021      Recent Labs   Lab Test 09/17/24  1233 06/22/21  0932    138   POTASSIUM 4.0 3.7   CHLORIDE 101 105   CO2 26 27   ANIONGAP 12 6   GLC 77 92   BUN 7.5 7   CR 0.68 0.82   EILEEN 9.3 8.7     Recent Labs   Lab Test 06/22/21  0932   CHOL 197   HDL 71   *   TRIG 53   NHDL 126        ASSESSMENT AND PLAN:  #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 Merlos in [facility] Clinic today. We discussed her interval history since I last saw her.   She unfortunately suffered an injury from her joint laxity she is recovering from.  She Will explore other exercise, such as yoga and walking.      Her blood pressure is under excellent control. She does note her HR has been low at times. Discussed options and will send out a 2 week zio patch.     She already see ophthalmology. Will refer to audiology. Echo shows no mitral valve disease.     Will refer to genetics to see about getting her genetic testing or repeating it so verify the Stickler Syndrome     MRA is unchanged of the chest and abdomen with no concerning findings. Will call with MRA head results.  Will plan to see her back in 12-24 months with MRA of braine, chest and pelvis.       She understands and is in agreement with the plan. It was a pleasure to " see her. Please do not hesitate to contact me with questions.      KENY Bardales MD   45 minutes face-face, documentation and review of records on day of visit    Please do not hesitate to contact me if you have any questions/concerns.     Sincerely,     Rosa Bardales MD

## 2024-09-17 NOTE — NURSING NOTE
Chief Complaint   Patient presents with    Follow Up     9/17/2024 visit with Rosa Bardales MD for RETURN GENETIC HEART - (March) Dx: Loeys-Dara syndrome type 4       Vitals were taken, medications reconciled, and EKG was performed.    Vic Iniguez, EMT  2:06 PM

## 2024-09-17 NOTE — NURSING NOTE
Cardiac Monitors: Patient was instructed regarding the indication, function, care and prompt return of a holter  monitor. The monitor was placed on the patient with instructions regarding care of the skin electrodes and monitor, as well as documentation in the patient diary. Patient demonstrated understanding of this information and agreed to call with further questions or concerns.    Cardiac Testing: Patient given instructions regarding  head to pelvis MRAs. Discussed purpose, preparation, procedure and when to expect results reported back to the patient. Patient demonstrated understanding of this information and agreed to call with further questions or concerns.    Return Appointment: Patient given instructions regarding scheduling next clinic visit. Patient demonstrated understanding of this information and agreed to call with further questions or concerns.    Patient stated she understood all health information given and agreed to call with further questions or concerns.

## 2024-09-18 ENCOUNTER — MYC MEDICAL ADVICE (OUTPATIENT)
Dept: CARDIOLOGY | Facility: CLINIC | Age: 34
End: 2024-09-18
Payer: COMMERCIAL

## 2024-09-18 ENCOUNTER — TELEPHONE (OUTPATIENT)
Dept: CARDIOLOGY | Facility: CLINIC | Age: 34
End: 2024-09-18
Payer: COMMERCIAL

## 2024-09-18 DIAGNOSIS — I67.1 CEREBRAL ANEURYSM, NONRUPTURED: Primary | ICD-10-CM

## 2024-09-18 DIAGNOSIS — Q89.8 STICKLER SYNDROME: ICD-10-CM

## 2024-09-18 DIAGNOSIS — Q87.89 LOEYS-DIETZ SYNDROME TYPE 4: ICD-10-CM

## 2024-09-18 LAB
ATRIAL RATE - MUSE: 44 BPM
DIASTOLIC BLOOD PRESSURE - MUSE: NORMAL MMHG
INTERPRETATION ECG - MUSE: NORMAL
P AXIS - MUSE: 78 DEGREES
PR INTERVAL - MUSE: 190 MS
QRS DURATION - MUSE: 84 MS
QT - MUSE: 480 MS
QTC - MUSE: 410 MS
R AXIS - MUSE: 78 DEGREES
SYSTOLIC BLOOD PRESSURE - MUSE: NORMAL MMHG
T AXIS - MUSE: 65 DEGREES
VENTRICULAR RATE- MUSE: 44 BPM

## 2024-09-19 NOTE — TELEPHONE ENCOUNTER
Date: 9/19/2024    Time of Call: 11:50 AM     Diagnosis:   2mm outpouching/infundibulum      [ TORB ] Ordering provider: BRAYAN Bardales MD    Order: CTA head in 6 months     Order received by: Natali Freeman RN       Follow-up/additional notes: updated patient

## 2024-10-26 ENCOUNTER — HEALTH MAINTENANCE LETTER (OUTPATIENT)
Age: 34
End: 2024-10-26

## 2024-11-12 NOTE — PROGRESS NOTES
Here is a copy of the progress note from your recent genetic counseling visit to [facility] on Date: 11/14/2024.    PROGRESS NOTE: Day was referred by KENY Bardales MD for genetic counseling due to concern for Stickler Syndrome. I had the opportunity to talk with Day today to discuss the genetic component of  Stickler syndrome  and testing options available to her.     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 Day follows with Dr. Bardales for the cardiovascular concerns of LDS. At her last appointment with Dr. Bardales, Day recalled that Stickler syndrome was also noted on the genetic testing report from 2015.     The trio exome sequencing ordered in 2015 from [facility] identified a variant of uncertain significance (VUS) in the RVX43D0 gene (c.3076 C>T) in Day and her mother. The SIU19F1 gene is associated with Stickler syndrome. I contacted [facility] to determine if they had any updated information on this variant since its original classification in 2015. They reported that as of 2021, this variant is still classified as a VUS.     We discussed Stickler syndrome and its associated symptoms. Day reports myopia, pigment dispersion, osteoarthritis since age 17, and mild hearing impairment. She has a referral placed to see audiology and already follows with ophthalmology. She denies any history of retinal detachment, cataracts, midface underdevelopment, and cleft palate.     FAMILY HISTORY: A detailed family history has previously been obtained (see pedigree from 6/24/2021 and 11/24/2017) and was updated today. It was significant for the following cardiac history:    Maternal history  Mother: Loeys-Dara syndrome, mitral and  aortic valve replacement at a young age. She  at 54 from a thrombosed valve. She was also found to have the [id number] VUS.  Aunt: MVP, connective tissue features  Uncle,  at 59 from an unknown cause. He had a history of MVP, GERD, and CAD.   Aunt,  at 63 due to a fall and brain bleed. She has a history of MVP and connective tissue features.  Aunt,  at 62 from an unknown cause. She had connective tissue features.  Uncle, scoliosis, HTN.  Grandfather,  at 87. He had a history of an abdominal aneurysm, brain aneurysm, and diabetes  Grandmother,  at 70. She had a history of an aortic aneurysm and MVP.  Paternal history  Father, high cholesterol, HTN  No other history of connective tissue disorders or heart disease   Siblings  Brother: Loeys-Dara syndrome. He has a 10 month old daughter.       There is no additional history of cardiomyopathy, arrhythmias, heart attacks, fainting, sudden cardiac death, genetic conditions, or birth defects. (Scanned pedigree may be under media tab)    DISCUSSION:  Stickler syndrome is a connective tissue disorder that can include eye findings such as nearsightedness (myopia), cataracts, vitreous abnormalities, retinal detachments, glaucoma; hearing loss that is both conductive and sensorineural; underdeveloped facial bones and cleft palate; and early-onset degenerative joint disease.     6 genes are known to cause Stickler syndrome, including FQI96F0 which makes up ~20% of Stickler syndrome cases. Stickler syndrome caused by mutations in the SWO04X6 gene is inherited in an autosomal dominant inheritance pattern, meaning an individual needs a mutation on only one copy of their genes to be affected. Individuals with an autosomal dominant condition have a 50% to pass the condition on to each of their children. Stickler syndrome is associated with complete penetrance meaning that each person who carries the gene mutation will have the condition, however the severity of  the condition can vary, even within the same family.     The LLY67H9 (c.3076 C>T) variant found in Day and her mother is currently classified as a variant of uncertain significance (VUS). A VUS means that there is a genetic change in which we do not have enough information to determine if it is disease causing or not. Labs review published data, functional studies, presences in population databases, similarity to normal sequence, and computer prediction models. [facility] will look for new publications or internal cases to see if they have any new data to reclassify this variant. They will contact me in a few weeks when this is completed.    Day has some symptoms that overlap with Stickler syndrome including myopia, mild hearing impairment, and osteoarthritis. However, Day also has LDS which can present with these features. Day has a strong family history of connective tissue disorder symptoms on the maternal side of her family. It is uncertain at this time whether this history can be completely explained by LDS.    The management for Stickler syndrome includes managing symptoms and surveillance. Annual examination by a vitreoretinal specialist; annual audiologic evaluations; and clinical, radiographic, and/or orthopedic assessment as needed is recommended.     All questions answered at this time.    PLAN: No additional genetic testing is indicated at this time. The lab will send out an updated report if they are able to reclassify the variant to positive or negative. I will call Day when I hear back from [facility].     TOTAL TIME SPENT IN COUNSELIN minutes    Donna Victor MS, [facility]  Licensed, Certified Genetic Counselor  [facility]  [facility]

## 2024-11-14 ENCOUNTER — VIRTUAL VISIT (OUTPATIENT)
Dept: CARDIOLOGY | Facility: CLINIC | Age: 34
End: 2024-11-14
Payer: COMMERCIAL

## 2024-11-14 DIAGNOSIS — Q87.89 LOEYS-DIETZ SYNDROME TYPE 4: ICD-10-CM

## 2024-11-14 DIAGNOSIS — Q89.8 STICKLER SYNDROME: ICD-10-CM

## 2024-11-14 DIAGNOSIS — Z82.49 FAMILIAL AORTIC ANEURYSM: ICD-10-CM

## 2024-11-14 PROCEDURE — 99207 PR NO CHARGE LOS: CPT | Mod: 95 | Performed by: GENETIC COUNSELOR, MS

## 2024-11-14 NOTE — LETTER
11/14/2024    Jennifer Peraza MD  ***no Info Available 6/21/23***    RE: Day Merlos       Dear Colleague,     I had the pleasure of seeing Day Merlos in the [facility].  Here is a copy of the progress note from your recent genetic counseling visit to [facility] on Date: 11/14/2024.    PROGRESS NOTE: Day was referred by KENY Bardales MD for genetic counseling due to concern for Stickler Syndrome. I had the opportunity to talk with Day today to discuss the genetic component of  Stickler syndrome  and testing options available to her.     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 Day follows with Dr. Bardales for the cardiovascular concerns of LDS. At her last appointment with Dr. Bardales, Day recalled that Stickler syndrome was also noted on the genetic testing report from 2015.     The trio exome sequencing ordered in 2015 from [facility] identified a variant of uncertain significance (VUS) in the TSQ67Z2 gene (c.3076 C>T) in Day and her mother. The XVQ23X4 gene is associated with Stickler syndrome. I contacted [facility] to determine if they had any updated information on this variant since its original classification in 2015. They reported that as of 2021, this variant is still classified as a VUS.     We discussed Stickler syndrome and its associated symptoms. Day reports myopia, pigment dispersion, osteoarthritis since age 17, and mild hearing impairment. She has a referral placed to see audiology and already follows with ophthalmology. She denies any history of retinal detachment, cataracts, midface underdevelopment, and cleft palate.     FAMILY HISTORY: A detailed family history has  previously been obtained (see pedigree from 2021 and 2017) and was updated today. It was significant for the following cardiac history:    Maternal history  Mother: Loeys-Dara syndrome, mitral and aortic valve replacement at a young age. She  at 54 from a thrombosed valve. She was also found to have the JPX56C4 VUS.  Aunt: MVP, connective tissue features  Uncle,  at 59 from an unknown cause. He had a history of MVP, GERD, and CAD.   Aunt,  at 63 due to a fall and brain bleed. She has a history of MVP and connective tissue features.  Aunt,  at 62 from an unknown cause. She had connective tissue features.  Uncle, scoliosis, HTN.  Grandfather,  at 87. He had a history of an abdominal aneurysm, brain aneurysm, and diabetes  Grandmother,  at 70. She had a history of an aortic aneurysm and MVP.  Paternal history  Father, high cholesterol, HTN  No other history of connective tissue disorders or heart disease   Siblings  Brother: Loeys-Dara syndrome. He has a 10 month old daughter.       There is no additional history of cardiomyopathy, arrhythmias, heart attacks, fainting, sudden cardiac death, genetic conditions, or birth defects. (Scanned pedigree may be under media tab)    DISCUSSION:  Stickler syndrome is a connective tissue disorder that can include eye findings such as nearsightedness (myopia), cataracts, vitreous abnormalities, retinal detachments, glaucoma; hearing loss that is both conductive and sensorineural; underdeveloped facial bones and cleft palate; and early-onset degenerative joint disease.     6 genes are known to cause Stickler syndrome, including SOY43K1 which makes up ~20% of Stickler syndrome cases. Stickler syndrome caused by mutations in the DGQ38Y4 gene is inherited in an autosomal dominant inheritance pattern, meaning an individual needs a mutation on only one copy of their genes to be affected. Individuals with an autosomal dominant condition have a 50% to  pass the condition on to each of their children. Stickler syndrome is associated with complete penetrance meaning that each person who carries the gene mutation will have the condition, however the severity of the condition can vary, even within the same family.     The MSY27L8 (c.3076 C>T) variant found in Day and her mother is currently classified as a variant of uncertain significance (VUS). A VUS means that there is a genetic change in which we do not have enough information to determine if it is disease causing or not. Labs review published data, functional studies, presences in population databases, similarity to normal sequence, and computer prediction models. [facility] will look for new publications or internal cases to see if they have any new data to reclassify this variant. They will contact me in a few weeks when this is completed.    Day has some symptoms that overlap with Stickler syndrome including myopia, mild hearing impairment, and osteoarthritis. However, Day also has LDS which can present with these features. Day has a strong family history of connective tissue disorder symptoms on the maternal side of her family. It is uncertain at this time whether this history can be completely explained by LDS.    The management for Stickler syndrome includes managing symptoms and surveillance. Annual examination by a vitreoretinal specialist; annual audiologic evaluations; and clinical, radiographic, and/or orthopedic assessment as needed is recommended.     All questions answered at this time.    PLAN: No additional genetic testing is indicated at this time. The lab will send out an updated report if they are able to reclassify the variant to positive or negative. I will call Day when I hear back from [facility].     TOTAL TIME SPENT IN COUNSELIN minutes    Donna Victor MS, [facility]  Licensed, Certified Genetic Counselor  [facility]  [facility]  [facility]        Thank you for allowing me to participate in the care of your patient.      Sincerely,     Karli Panchal [facility]  cc:   Rosa Bardales MD  [address]  [place] [id number]